# Patient Record
Sex: FEMALE | Race: WHITE | NOT HISPANIC OR LATINO | Employment: FULL TIME | URBAN - METROPOLITAN AREA
[De-identification: names, ages, dates, MRNs, and addresses within clinical notes are randomized per-mention and may not be internally consistent; named-entity substitution may affect disease eponyms.]

---

## 2016-10-27 LAB
EXTERNAL ABO GROUPING: NORMAL
EXTERNAL ANTIBODY SCREEN: NORMAL
EXTERNAL HEMATOCRIT: 33.7 %
EXTERNAL HEMOGLOBIN: 11.5 G/DL
EXTERNAL HEPATITIS B SURFACE ANTIGEN: NEGATIVE
EXTERNAL PLATELET COUNT: 238 K/ΜL
EXTERNAL RH FACTOR: POSITIVE
EXTERNAL RUBELLA IGG QUANTITATION: NORMAL
EXTERNAL SYPHILIS RPR SCREEN: NORMAL

## 2017-01-25 ENCOUNTER — ALLSCRIPTS OFFICE VISIT (OUTPATIENT)
Dept: OTHER | Facility: OTHER | Age: 27
End: 2017-01-25

## 2017-01-25 ENCOUNTER — APPOINTMENT (OUTPATIENT)
Dept: LAB | Facility: HOSPITAL | Age: 27
End: 2017-01-25
Attending: OBSTETRICS & GYNECOLOGY
Payer: COMMERCIAL

## 2017-01-25 ENCOUNTER — GENERIC CONVERSION - ENCOUNTER (OUTPATIENT)
Dept: OTHER | Facility: OTHER | Age: 27
End: 2017-01-25

## 2017-01-25 DIAGNOSIS — Z34.82 ENCOUNTER FOR SUPERVISION OF OTHER NORMAL PREGNANCY, SECOND TRIMESTER: ICD-10-CM

## 2017-01-25 LAB
BASOPHILS # BLD AUTO: 0.02 THOUSANDS/ΜL (ref 0–0.1)
BASOPHILS NFR BLD AUTO: 0 % (ref 0–1)
EOSINOPHIL # BLD AUTO: 0.06 THOUSAND/ΜL (ref 0–0.61)
EOSINOPHIL NFR BLD AUTO: 1 % (ref 0–6)
ERYTHROCYTE [DISTWIDTH] IN BLOOD BY AUTOMATED COUNT: 13 % (ref 11.6–15.1)
GLUCOSE 1H P 50 G GLC PO SERPL-MCNC: 134 MG/DL
HCT VFR BLD AUTO: 33.1 % (ref 34.8–46.1)
HGB BLD-MCNC: 10.9 G/DL (ref 11.5–15.4)
LYMPHOCYTES # BLD AUTO: 1.89 THOUSANDS/ΜL (ref 0.6–4.47)
LYMPHOCYTES NFR BLD AUTO: 20 % (ref 14–44)
MCH RBC QN AUTO: 30.7 PG (ref 26.8–34.3)
MCHC RBC AUTO-ENTMCNC: 32.9 G/DL (ref 31.4–37.4)
MCV RBC AUTO: 93 FL (ref 82–98)
MONOCYTES # BLD AUTO: 0.48 THOUSAND/ΜL (ref 0.17–1.22)
MONOCYTES NFR BLD AUTO: 5 % (ref 4–12)
NEUTROPHILS # BLD AUTO: 7.11 THOUSANDS/ΜL (ref 1.85–7.62)
NEUTS SEG NFR BLD AUTO: 74 % (ref 43–75)
PLATELET # BLD AUTO: 292 THOUSANDS/UL (ref 149–390)
PMV BLD AUTO: 10.6 FL (ref 8.9–12.7)
RBC # BLD AUTO: 3.55 MILLION/UL (ref 3.81–5.12)
WBC # BLD AUTO: 9.56 THOUSAND/UL (ref 4.31–10.16)

## 2017-01-25 PROCEDURE — 86592 SYPHILIS TEST NON-TREP QUAL: CPT

## 2017-01-25 PROCEDURE — 82950 GLUCOSE TEST: CPT

## 2017-01-25 PROCEDURE — 36415 COLL VENOUS BLD VENIPUNCTURE: CPT

## 2017-01-25 PROCEDURE — 85025 COMPLETE CBC W/AUTO DIFF WBC: CPT

## 2017-01-26 LAB — RPR SER QL: NORMAL

## 2017-02-08 ENCOUNTER — GENERIC CONVERSION - ENCOUNTER (OUTPATIENT)
Dept: OTHER | Facility: OTHER | Age: 27
End: 2017-02-08

## 2017-02-22 ENCOUNTER — ALLSCRIPTS OFFICE VISIT (OUTPATIENT)
Dept: OTHER | Facility: OTHER | Age: 27
End: 2017-02-22

## 2017-03-08 ENCOUNTER — GENERIC CONVERSION - ENCOUNTER (OUTPATIENT)
Dept: OTHER | Facility: OTHER | Age: 27
End: 2017-03-08

## 2017-03-22 ENCOUNTER — LAB REQUISITION (OUTPATIENT)
Dept: LAB | Facility: HOSPITAL | Age: 27
End: 2017-03-22
Payer: COMMERCIAL

## 2017-03-22 ENCOUNTER — GENERIC CONVERSION - ENCOUNTER (OUTPATIENT)
Dept: OTHER | Facility: OTHER | Age: 27
End: 2017-03-22

## 2017-03-22 DIAGNOSIS — O99.343 OTHER MENTAL DISORDERS COMPLICATING PREGNANCY, THIRD TRIMESTER: ICD-10-CM

## 2017-03-22 PROCEDURE — 87653 STREP B DNA AMP PROBE: CPT | Performed by: OBSTETRICS & GYNECOLOGY

## 2017-03-24 LAB — GP B STREP DNA SPEC QL NAA+PROBE: NORMAL

## 2017-03-28 ENCOUNTER — GENERIC CONVERSION - ENCOUNTER (OUTPATIENT)
Dept: OTHER | Facility: OTHER | Age: 27
End: 2017-03-28

## 2017-03-28 ENCOUNTER — ALLSCRIPTS OFFICE VISIT (OUTPATIENT)
Dept: OTHER | Facility: OTHER | Age: 27
End: 2017-03-28

## 2017-04-05 ENCOUNTER — GENERIC CONVERSION - ENCOUNTER (OUTPATIENT)
Dept: OTHER | Facility: OTHER | Age: 27
End: 2017-04-05

## 2017-04-12 ENCOUNTER — GENERIC CONVERSION - ENCOUNTER (OUTPATIENT)
Dept: OTHER | Facility: OTHER | Age: 27
End: 2017-04-12

## 2017-04-18 PROBLEM — F41.9 ANXIETY DURING PREGNANCY IN THIRD TRIMESTER, ANTEPARTUM: Status: ACTIVE | Noted: 2017-04-18

## 2017-04-18 PROBLEM — O99.343 ANXIETY DURING PREGNANCY IN THIRD TRIMESTER, ANTEPARTUM: Status: ACTIVE | Noted: 2017-04-18

## 2017-04-18 PROBLEM — O98.313 GENITAL HERPES AFFECTING PREGNANCY IN THIRD TRIMESTER: Status: ACTIVE | Noted: 2017-04-18

## 2017-04-18 PROBLEM — A60.09 GENITAL HERPES AFFECTING PREGNANCY IN THIRD TRIMESTER: Status: ACTIVE | Noted: 2017-04-18

## 2017-04-19 ENCOUNTER — HOSPITAL ENCOUNTER (OUTPATIENT)
Dept: LABOR AND DELIVERY | Facility: HOSPITAL | Age: 27
Discharge: HOME | End: 2017-04-19
Payer: COMMERCIAL

## 2017-04-19 ENCOUNTER — ANESTHESIA EVENT (INPATIENT)
Dept: LABOR AND DELIVERY | Facility: HOSPITAL | Age: 27
End: 2017-04-19
Payer: COMMERCIAL

## 2017-04-19 ENCOUNTER — ANESTHESIA (INPATIENT)
Dept: LABOR AND DELIVERY | Facility: HOSPITAL | Age: 27
End: 2017-04-19
Payer: COMMERCIAL

## 2017-04-19 ENCOUNTER — HOSPITAL ENCOUNTER (INPATIENT)
Facility: HOSPITAL | Age: 27
LOS: 1 days | Discharge: HOME | End: 2017-04-20
Attending: OBSTETRICS & GYNECOLOGY | Admitting: OBSTETRICS & GYNECOLOGY
Payer: COMMERCIAL

## 2017-04-19 DIAGNOSIS — Z3A.40 40 WEEKS GESTATION OF PREGNANCY: ICD-10-CM

## 2017-04-19 PROBLEM — F41.9 ANXIETY: Status: ACTIVE | Noted: 2017-04-19

## 2017-04-19 PROBLEM — F98.8 OTHER SPECIFIED BEHAVIORAL AND EMOTIONAL DISORDERS WITH ONSET USUALLY OCCURRING IN CHILDHOOD AND ADOLESCENCE: Status: ACTIVE | Noted: 2017-04-19

## 2017-04-19 LAB
ABO GROUP BLD: NORMAL
BASE EXCESS BLDCOA CALC-SCNC: -5.3 MMOL/L (ref 3–11)
BASE EXCESS BLDCOV CALC-SCNC: -6.3 MMOL/L (ref 1–9)
BLD GP AB SCN SERPL QL: NEGATIVE
ERYTHROCYTE [DISTWIDTH] IN BLOOD BY AUTOMATED COUNT: 14.4 % (ref 11.6–15.1)
HCO3 BLDCOA-SCNC: 22 MMOL/L (ref 17.3–27.3)
HCO3 BLDCOV-SCNC: 18.2 MMOL/L (ref 12.2–28.6)
HCT VFR BLD AUTO: 37.5 % (ref 34.8–46.1)
HGB BLD-MCNC: 12.4 G/DL (ref 11.5–15.4)
MCH RBC QN AUTO: 31.3 PG (ref 26.8–34.3)
MCHC RBC AUTO-ENTMCNC: 33.1 G/DL (ref 31.4–37.4)
MCV RBC AUTO: 95 FL (ref 82–98)
O2 CT VFR BLDCOA CALC: 9.1 ML/DL
OXYHGB MFR BLDCOA: 39.2 %
OXYHGB MFR BLDCOV: 75.4 %
PCO2 BLDCOA: 49.2 MM[HG] (ref 30–60)
PCO2 BLDCOV: 34.1 MM HG (ref 27–43)
PH BLDCOA: 7.27 [PH] (ref 7.23–7.43)
PH BLDCOV: 7.35 [PH] (ref 7.19–7.49)
PLATELET # BLD AUTO: 288 THOUSANDS/UL (ref 149–390)
PMV BLD AUTO: 12.5 FL (ref 8.9–12.7)
PO2 BLDCOA: 18.2 MM HG (ref 5–25)
PO2 BLDCOV: 32.2 MM HG (ref 15–45)
RBC # BLD AUTO: 3.96 MILLION/UL (ref 3.81–5.12)
RH BLD: POSITIVE
SAO2 % BLDCOV: 17.9 ML/DL
SPECIMEN EXPIRATION DATE: NORMAL
WBC # BLD AUTO: 10.6 THOUSAND/UL (ref 4.31–10.16)

## 2017-04-19 PROCEDURE — 3E0P7GC INTRODUCTION OF OTHER THERAPEUTIC SUBSTANCE INTO FEMALE REPRODUCTIVE, VIA NATURAL OR ARTIFICIAL OPENING: ICD-10-PCS | Performed by: OBSTETRICS & GYNECOLOGY

## 2017-04-19 PROCEDURE — 86900 BLOOD TYPING SEROLOGIC ABO: CPT | Performed by: OBSTETRICS & GYNECOLOGY

## 2017-04-19 PROCEDURE — 85027 COMPLETE CBC AUTOMATED: CPT | Performed by: OBSTETRICS & GYNECOLOGY

## 2017-04-19 PROCEDURE — 86592 SYPHILIS TEST NON-TREP QUAL: CPT | Performed by: OBSTETRICS & GYNECOLOGY

## 2017-04-19 PROCEDURE — 10907ZC DRAINAGE OF AMNIOTIC FLUID, THERAPEUTIC FROM PRODUCTS OF CONCEPTION, VIA NATURAL OR ARTIFICIAL OPENING: ICD-10-PCS | Performed by: OBSTETRICS & GYNECOLOGY

## 2017-04-19 PROCEDURE — 86850 RBC ANTIBODY SCREEN: CPT | Performed by: OBSTETRICS & GYNECOLOGY

## 2017-04-19 PROCEDURE — 4A1HXCZ MONITORING OF PRODUCTS OF CONCEPTION, CARDIAC RATE, EXTERNAL APPROACH: ICD-10-PCS | Performed by: OBSTETRICS & GYNECOLOGY

## 2017-04-19 PROCEDURE — 82805 BLOOD GASES W/O2 SATURATION: CPT | Performed by: OBSTETRICS & GYNECOLOGY

## 2017-04-19 PROCEDURE — 3E033VJ INTRODUCTION OF OTHER HORMONE INTO PERIPHERAL VEIN, PERCUTANEOUS APPROACH: ICD-10-PCS | Performed by: OBSTETRICS & GYNECOLOGY

## 2017-04-19 PROCEDURE — 86901 BLOOD TYPING SEROLOGIC RH(D): CPT | Performed by: OBSTETRICS & GYNECOLOGY

## 2017-04-19 PROCEDURE — 0HQ9XZZ REPAIR PERINEUM SKIN, EXTERNAL APPROACH: ICD-10-PCS | Performed by: OBSTETRICS & GYNECOLOGY

## 2017-04-19 RX ORDER — DIAPER,BRIEF,INFANT-TODD,DISP
1 EACH MISCELLANEOUS 4 TIMES DAILY PRN
Status: DISCONTINUED | OUTPATIENT
Start: 2017-04-19 | End: 2017-04-20 | Stop reason: HOSPADM

## 2017-04-19 RX ORDER — ACETAMINOPHEN 325 MG/1
650 TABLET ORAL EVERY 6 HOURS PRN
Status: DISCONTINUED | OUTPATIENT
Start: 2017-04-19 | End: 2017-04-20 | Stop reason: HOSPADM

## 2017-04-19 RX ORDER — OXYCODONE HYDROCHLORIDE AND ACETAMINOPHEN 5; 325 MG/1; MG/1
2 TABLET ORAL EVERY 4 HOURS PRN
Status: DISCONTINUED | OUTPATIENT
Start: 2017-04-19 | End: 2017-04-20 | Stop reason: HOSPADM

## 2017-04-19 RX ORDER — DOCUSATE SODIUM 100 MG/1
100 CAPSULE, LIQUID FILLED ORAL 2 TIMES DAILY
Status: DISCONTINUED | OUTPATIENT
Start: 2017-04-19 | End: 2017-04-20 | Stop reason: HOSPADM

## 2017-04-19 RX ORDER — OXYCODONE HYDROCHLORIDE AND ACETAMINOPHEN 5; 325 MG/1; MG/1
1 TABLET ORAL EVERY 4 HOURS PRN
Status: DISCONTINUED | OUTPATIENT
Start: 2017-04-19 | End: 2017-04-20 | Stop reason: HOSPADM

## 2017-04-19 RX ORDER — CALCIUM CARBONATE 200(500)MG
500 TABLET,CHEWABLE ORAL 3 TIMES DAILY PRN
Status: DISCONTINUED | OUTPATIENT
Start: 2017-04-19 | End: 2017-04-20 | Stop reason: HOSPADM

## 2017-04-19 RX ORDER — OXYTOCIN/RINGER'S LACTATE 30/500 ML
1-30 PLASTIC BAG, INJECTION (ML) INTRAVENOUS
Status: DISCONTINUED | OUTPATIENT
Start: 2017-04-19 | End: 2017-04-19

## 2017-04-19 RX ORDER — DIPHENHYDRAMINE HYDROCHLORIDE 50 MG/ML
25 INJECTION INTRAMUSCULAR; INTRAVENOUS EVERY 6 HOURS PRN
Status: DISCONTINUED | OUTPATIENT
Start: 2017-04-19 | End: 2017-04-20 | Stop reason: HOSPADM

## 2017-04-19 RX ORDER — PNV NO.95/FERROUS FUM/FOLIC AC 28MG-0.8MG
1 TABLET ORAL DAILY
COMMUNITY
End: 2018-09-12

## 2017-04-19 RX ORDER — ONDANSETRON 2 MG/ML
4 INJECTION INTRAMUSCULAR; INTRAVENOUS EVERY 8 HOURS PRN
Status: DISCONTINUED | OUTPATIENT
Start: 2017-04-19 | End: 2017-04-20 | Stop reason: HOSPADM

## 2017-04-19 RX ORDER — ONDANSETRON 2 MG/ML
4 INJECTION INTRAMUSCULAR; INTRAVENOUS EVERY 4 HOURS PRN
Status: DISCONTINUED | OUTPATIENT
Start: 2017-04-19 | End: 2017-04-20 | Stop reason: HOSPADM

## 2017-04-19 RX ORDER — SODIUM CHLORIDE, SODIUM LACTATE, POTASSIUM CHLORIDE, CALCIUM CHLORIDE 600; 310; 30; 20 MG/100ML; MG/100ML; MG/100ML; MG/100ML
125 INJECTION, SOLUTION INTRAVENOUS CONTINUOUS
Status: DISCONTINUED | OUTPATIENT
Start: 2017-04-19 | End: 2017-04-19

## 2017-04-19 RX ORDER — DIPHENHYDRAMINE HCL 25 MG
25 TABLET ORAL EVERY 6 HOURS PRN
Status: DISCONTINUED | OUTPATIENT
Start: 2017-04-19 | End: 2017-04-20 | Stop reason: HOSPADM

## 2017-04-19 RX ORDER — IBUPROFEN 600 MG/1
600 TABLET ORAL EVERY 6 HOURS PRN
Status: DISCONTINUED | OUTPATIENT
Start: 2017-04-19 | End: 2017-04-20 | Stop reason: HOSPADM

## 2017-04-19 RX ADMIN — SODIUM CHLORIDE, SODIUM LACTATE, POTASSIUM CHLORIDE, AND CALCIUM CHLORIDE 125 ML/HR: .6; .31; .03; .02 INJECTION, SOLUTION INTRAVENOUS at 14:45

## 2017-04-19 RX ADMIN — Medication 10 ML/HR: at 17:06

## 2017-04-19 RX ADMIN — MISOPROSTOL 25 MCG: 100 TABLET ORAL at 07:07

## 2017-04-19 RX ADMIN — Medication 25 MCG: at 07:07

## 2017-04-20 VITALS
TEMPERATURE: 98.9 F | WEIGHT: 136 LBS | DIASTOLIC BLOOD PRESSURE: 97 MMHG | HEIGHT: 62 IN | OXYGEN SATURATION: 99 % | SYSTOLIC BLOOD PRESSURE: 133 MMHG | RESPIRATION RATE: 18 BRPM | HEART RATE: 75 BPM | BODY MASS INDEX: 25.03 KG/M2

## 2017-04-20 LAB — RPR SER QL: NORMAL

## 2017-04-20 RX ORDER — IBUPROFEN 600 MG/1
600 TABLET ORAL EVERY 6 HOURS PRN
Qty: 30 TABLET | Refills: 0
Start: 2017-04-20 | End: 2018-09-12

## 2017-04-20 RX ORDER — DOCUSATE SODIUM 100 MG/1
100 CAPSULE, LIQUID FILLED ORAL 2 TIMES DAILY
Qty: 60 CAPSULE | Refills: 0
Start: 2017-04-20 | End: 2018-09-12

## 2017-04-20 RX ORDER — ACETAMINOPHEN 325 MG/1
650 TABLET ORAL EVERY 4 HOURS PRN
Qty: 30 TABLET | Refills: 0
Start: 2017-04-20 | End: 2018-09-12

## 2017-04-20 RX ORDER — DIAPER,BRIEF,INFANT-TODD,DISP
1 EACH MISCELLANEOUS 4 TIMES DAILY PRN
Qty: 30 G | Refills: 0
Start: 2017-04-20 | End: 2018-09-12

## 2017-04-20 RX ADMIN — DOCUSATE SODIUM 100 MG: 100 CAPSULE, LIQUID FILLED ORAL at 17:59

## 2017-04-20 RX ADMIN — IBUPROFEN 600 MG: 600 TABLET, FILM COATED ORAL at 02:42

## 2017-04-20 RX ADMIN — DOCUSATE SODIUM 100 MG: 100 CAPSULE, LIQUID FILLED ORAL at 08:47

## 2017-04-20 RX ADMIN — IBUPROFEN 600 MG: 600 TABLET, FILM COATED ORAL at 16:30

## 2017-04-20 RX ADMIN — IBUPROFEN 600 MG: 600 TABLET, FILM COATED ORAL at 08:46

## 2017-04-20 RX ADMIN — Medication 1 TABLET: at 08:47

## 2017-04-24 ENCOUNTER — TELEPHONE (OUTPATIENT)
Dept: LABOR AND DELIVERY | Facility: HOSPITAL | Age: 27
End: 2017-04-24

## 2017-04-28 LAB — PLACENTA IN STORAGE: NORMAL

## 2017-05-15 ENCOUNTER — ALLSCRIPTS OFFICE VISIT (OUTPATIENT)
Dept: OTHER | Facility: OTHER | Age: 27
End: 2017-05-15

## 2017-05-30 ENCOUNTER — GENERIC CONVERSION - ENCOUNTER (OUTPATIENT)
Dept: OTHER | Facility: OTHER | Age: 27
End: 2017-05-30

## 2017-05-30 ENCOUNTER — ALLSCRIPTS OFFICE VISIT (OUTPATIENT)
Dept: OTHER | Facility: OTHER | Age: 27
End: 2017-05-30

## 2017-06-16 ENCOUNTER — ALLSCRIPTS OFFICE VISIT (OUTPATIENT)
Dept: OTHER | Facility: OTHER | Age: 27
End: 2017-06-16

## 2017-08-07 ENCOUNTER — TRANSCRIBE ORDERS (OUTPATIENT)
Dept: LAB | Facility: HOSPITAL | Age: 27
End: 2017-08-07

## 2017-08-07 DIAGNOSIS — Z00.8 HEALTH EXAMINATION IN POPULATION SURVEY: Primary | ICD-10-CM

## 2017-08-09 ENCOUNTER — APPOINTMENT (OUTPATIENT)
Dept: LAB | Facility: HOSPITAL | Age: 27
End: 2017-08-09
Payer: COMMERCIAL

## 2017-08-09 DIAGNOSIS — Z00.8 HEALTH EXAMINATION IN POPULATION SURVEY: ICD-10-CM

## 2017-08-09 LAB
CHOLEST SERPL-MCNC: 173 MG/DL (ref 50–200)
EST. AVERAGE GLUCOSE BLD GHB EST-MCNC: 114 MG/DL
HBA1C MFR BLD: 5.6 % (ref 4.2–6.3)
HDLC SERPL-MCNC: 71 MG/DL (ref 40–60)
LDLC SERPL CALC-MCNC: 94 MG/DL (ref 0–100)
TRIGL SERPL-MCNC: 41 MG/DL

## 2017-08-09 PROCEDURE — 83036 HEMOGLOBIN GLYCOSYLATED A1C: CPT

## 2017-08-09 PROCEDURE — 36415 COLL VENOUS BLD VENIPUNCTURE: CPT

## 2017-08-09 PROCEDURE — 80061 LIPID PANEL: CPT

## 2017-08-22 ENCOUNTER — ALLSCRIPTS OFFICE VISIT (OUTPATIENT)
Dept: OTHER | Facility: OTHER | Age: 27
End: 2017-08-22

## 2017-10-23 NOTE — PROGRESS NOTES
Assessment  1  Encounter for gynecological examination with abnormal finding (V72 31) (Z01 411)   2  Dietary calcium deficiency (269 3) (E58)   3  Anxiety disorder (300 00) (F41 9)   4  Encounter for surveillance of condom contraception (V25 49) (Z30 49)    Plan  Anxiety disorder,     · Sertraline HCl - 25 MG Oral Tablet; Take 1 tablet daily for 7 days, then take 2 a day  thereafter   Rx By: Lulu Pedraza; Dispense: 30 Days ; #:60 Tablet; Refill: 0;For: Anxiety disorder, ; GISSEL = N; Sent To: [address] [facility]  Encounter for gynecological examination with abnormal finding    · Follow-up visit in 1 year Evaluation and Treatment  Follow-up  Status: Hold For -  Scheduling  Requested for: 09Izd6929   Ordered; For: Encounter for gynecological examination with abnormal finding; Ordered By: Lulu Pedraza Performed:  Due: 61QGK4118   · Begin or continue regular aerobic exercise   Gradually work up to at least {count1}  sessions of {dur1} of exercise a week ; Status:Complete;   Done: 37Xon5812 10:26AM   Ordered;For:Encounter for gynecological examination with abnormal finding; Ordered By:Sandra Santamaria;   · Drink plenty of fluids ; Status:Complete;   Done: 04DWD7938 10:26AM   Ordered;For:Encounter for gynecological examination with abnormal finding; Ordered By:Sandra Santamaria;   · Eat a low fat and low cholesterol diet ; Status:Complete;   Done: 13KSK6623 10:26AM   Ordered;For:Encounter for gynecological examination with abnormal finding; Ordered By:Sandra Santamaria;   · Eat a normal well-balanced diet ; Status:Complete;   Done: 35DHF4592 10:26AM   Ordered;For:Encounter for gynecological examination with abnormal finding; Ordered By:Sandra Santamaria;   · Eat foods that are high in calcium ; Status:Complete;   Done: 31OOR5520 10:26AM   Ordered;For:Encounter for gynecological examination with abnormal finding; Ordered By:Sandra Santamaria;   · How to prevent vaginal infections ; Status:Complete;   Done: 27Are0981 10:26AM   Ordered;For:Encounter for gynecological examination with abnormal finding; Ordered By:Deidra Santamaria;   · Many types of infections can be passed person to person  To prevent this you need to be  isolated for 7 days ; Status:Complete;   Done: 22Aug2017 10:26AM   Ordered;For:Encounter for gynecological examination with abnormal finding; Ordered By:Deidra Santamaria;   · Regular aerobic exercise can help reduce stress ; Status:Complete;   Done: 47RFV5083  10:26AM   Ordered;For:Encounter for gynecological examination with abnormal finding; Ordered By:Dai Santamaria;   · Stretch and warm up your muscles during the first 10 minutes , then cool down your  muscles for the last 10 minutes of exercise ; Status:Complete;   Done: 11EVE3261  10:26AM   Ordered;For:Encounter for gynecological examination with abnormal finding; Ordered By:Deidra Santamaria;   · These are things you can do to prevent falls in and around the home ; Status:Complete;    Done: 22Aug2017 10:26AM   Ordered;For:Encounter for gynecological examination with abnormal finding; Ordered By:Deidra Santamaria;   · Use a sun block product with an SPF of 15 or more ; Status:Complete;   Done:  22Aug2017 10:26AM   Ordered;For:Encounter for gynecological examination with abnormal finding; Ordered By:Deidra Santamaria;   · We encourage all of our patients to exercise regularly    30 minutes of exercise or physical  activity five or more days a week is recommended for children and adults ;  Status:Complete;   Done: 22Aug2017 10:26AM   Ordered;For:Encounter for gynecological examination with abnormal finding; Ordered By:Deidra Santamaria;   · We recommend that you change your eating habits slowly ; Status:Complete;   Done:  24FXA0613 10:26AM   Ordered;For:Encounter for gynecological examination with abnormal finding; Ordered By:Deidra Santamaria;   · We recommend that you create an advance directive ; Status:Complete;   Done:  22Aug2017 10:26AM Ordered;For:Encounter for gynecological examination with abnormal finding; Ordered By:Dai Santamaria;   · We recommend that you follow these steps to lower your risk of osteoporosis  ;  Status:Complete;   Done: 24UJS7621 10:26AM   Ordered;For:Encounter for gynecological examination with abnormal finding; Ordered By:Maxx Santamaria;   · We recommend you modify your diet to achieve and maintain a healthy weight  Being  overweight may increase your risk for developing health problems such as diabetes,  heart disease, and cancer  Avoid high fat foods and eat a balanced diet rich  in fruits and vegetables  The combination of a reduced-calorie diet and increased  physical activity is recommended  Please let us know if you would like to  learn more about your nutrition and calorie needs, and additional options including  weight loss programs that can help you achieve your goals ; Status:Complete;   Done:  89Piz2949 10:26AM   Ordered;For:Encounter for gynecological examination with abnormal finding; Ordered By:Maxx Santamaria;    Discussion/Summary  Currently, she eats an adequate diet and has an inadequate exercise regimen  cervical cancer screening is current next cervical cancer screening is due 8/19 Breast cancer screening: breast cancer screening is not indicated  Colorectal cancer screening: colorectal cancer screening is not indicated  Osteoporosis screening: bone mineral density testing is not indicated  Advice and education were given regarding aerobic exercise, calcium supplements, reproductive health and contraception  Patient discussion: discussed with the patient  Ca++ intake encrouaged  Info sheets for non dairy sources provided  ENcourgaed pt to discuss CA++ need s of daughter with Peds (eczema and asked to avoid dairy)  The patient has the current Goals: Strong bones  better anxiety control  The patent has the current Barriers: 0  Patient is able to Self-Care        History of Present Illness  HPI: Pt is a 27 yo  c LMP lactating using condoms for [facility] presents for preventive care  same lifetime safe sexual practices followed does feel safe in relationshipDuse she does/not/is wish to begin/beginning process of cessation     Review of Systems  no nonmenstrual bleeding-- and-- no dysuria  OB History  Pregnancy History (Brief):   Prior pregnancies: : 2  Para: 2 (full-term)-- and-- 2 (living)   Delivery type: 2 vaginal       Active Problems  1  Acute upper respiratory infection (465 9) (J06 9)   2  Anxiety disorder (300 00) (F41 9)   3  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)   4  Contraceptive education (V25 09) (Z30 09)   5  Eczema (692 9) (L30 9)    Past Medical History   · Denied: History of abnormal cervical Pap smear   · History of chickenpox (V12 09) (Z86 19)   · History of herpes genitalis (V12 09) (Z86 19)   · History of pregnancy (V13 29)   · History of vaccination against human papillomavirus (V45 89) (Z92 29)   · History of Menarche (V21 8)    The active problems and past medical history were reviewed and updated today  Surgical History   · Denied: History Of Prior Surgery    The surgical history was reviewed and updated today         Family History  Mother    · Family history of Family Health Status Of Mother - Good  Father    · Family history of Family Health Status Of Father - Good  Maternal Grandmother    · Family history of lung cancer (V16 1) (Z80 1)  Paternal Grandmother    · Family history of myocardial infarction (V17 3) (Z82 49)  Family History    · Denied: Family history of breast cancer   · Denied: Family history of colon cancer   · Denied: FHx: ovarian cancer    Social History   · Education history   · BA   · Exercise habits   · active, but no formal exercise   · Monogamous relationship   · Never A Smoker   · No alcohol use   · social etoh, stopped with + UPT   · No drug use   · No preference on Mosque beliefs   · 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 [employer]  The social history was reviewed and updated today  Current Meds   1  Breast Pump Miscellaneous; electric breast pump with double pump setup, supplies,   AND INSTRUCTION in use; Therapy: 70HRX3212 to (Last Rx:28Mar2017) Ordered   2  Multi Prenatal TABS; Therapy: (Recorded:07Sep2016) to Recorded    Allergies  1  No Known Drug Allergies  2  No Known Environmental Allergies   3  No Known Food Allergies    Vitals   Recorded: 96VSA7535 32:05LY   Systolic 507, Sitting   Diastolic 80, Sitting   Height 5 ft 2 44 in   Weight 109 lb    BMI Calculated 19 66   BSA Calculated 1 48   LMP Breast Feeding     Physical Exam    Constitutional   General appearance: No acute distress, well appearing and well nourished  Neck   Neck: Normal, supple, trachea midline, no masses  Thyroid: Normal, no thyromegaly  Pulmonary   Respiratory effort: No increased work of breathing or signs of respiratory distress  Genitourinary   External genitalia: Normal and no lesions appreciated  Vagina: Normal, no lesions or dryness appreciated  -- red, smooth  Urethral meatus: Normal     Bladder: Normal, soft, non-tender and no prolapse or masses appreciated  Cervix: Normal, no palpable masses  -- parous s lesions  Uterus: Normal, non-tender, not enlarged, and no palpable masses  -- NSSC,AF,NT,mobile  Adnexa/parametria: Normal, non-tender and no fullness or masses appreciated  -- no masses or tenderness  Anus, perineum, and rectum: Normal sphincter tone, no masses, and no prolapse  No masses or noduaslrity  RV adeq thickness   Chest   Breasts: Normal and no dimpling or skin changes noted  -- lactating  Abdomen   Abdomen: Normal, non-tender, and no organomegaly noted  Liver and spleen: No hepatomegaly or splenomegaly  Examination for hernias: No hernias appreciated  Lymphatic   Palpation of lymph nodes in neck, axillae, groin and/or other locations: No lymphadenopathy or masses noted      Skin Skin and subcutaneous tissue: Normal skin turgor and no rashes      Psychiatric   Orientation to person, place, and time: Normal     Mood and affect: Normal        Signatures   Electronically signed by : LENIN Castanon ; Aug 22 2017 10:33AM EST                       (Author)

## 2018-01-12 NOTE — PROGRESS NOTES
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)    Current Meds   1  Amphetamine-Dextroamphet ER 20 MG Oral Capsule Extended Release 24 Hour; TAKE   1 CAPSULE DAILY FOR ADHD; Therapy: 95SCL2333 to (Evaluate:08Jan2015); Last Rx:22Pnj8357 Ordered    Allergies    1  No Known Drug Allergies    Vitals  Signs    LMP: 43ZBW9160  Height: 5 ft 2 5 in  Weight: 110 lb   BMI Calculated: 19 8  BSA Calculated: 1 49    Results/Data  Urine HCG- POC 83Hae3065 01:50PM Ismael Waldron     Test Name Result Flag Reference   Urine HCG Positive         Assessment    1  Menstruation, suppression (626 8) (N94 89)    Plan  Menstruation, suppression    · Urine HCG- POC; Status:Complete - Retrospective By Protocol Authorization;   Done:  61OCK5523 01:50PM    Future Appointments    Date/Time Provider Specialty Site   09/07/2016 10:30 AM LENIN Morse   [address] [facility]     Signatures   Electronically signed by : LENIN Zarate ; Aug 25 2016  2:13PM EST                       (Author)

## 2018-01-13 VITALS — BODY MASS INDEX: 20.34 KG/M2 | WEIGHT: 114.8 LBS | HEIGHT: 63 IN

## 2018-01-13 VITALS
WEIGHT: 109 LBS | SYSTOLIC BLOOD PRESSURE: 130 MMHG | BODY MASS INDEX: 20.06 KG/M2 | HEIGHT: 62 IN | DIASTOLIC BLOOD PRESSURE: 80 MMHG

## 2018-01-13 NOTE — RESULT NOTES
Message   Please call patient with reassuring results  Thank you       Verified Results  (1) SEQUENTIAL SCREEN 2 27Oct2016 11:24AM Jasmin Villagran     Test Name Result Flag Reference   SCAN RESULT      Results available in the [facility]

## 2018-01-14 VITALS
DIASTOLIC BLOOD PRESSURE: 60 MMHG | BODY MASS INDEX: 20.55 KG/M2 | SYSTOLIC BLOOD PRESSURE: 110 MMHG | HEIGHT: 63 IN | WEIGHT: 116 LBS

## 2018-01-15 NOTE — PROGRESS NOTES
DEC 1 2016         RE: Tomasz Hannon                                 To: Jonny Jeans, M D    MR#: [id number]                                    [address], [address]   : [facility] [address] [facility], [address] [place] [address],[address]   ENC: [id number]                             Fax: [phone]   (Exam #: [id number])      The LMP of this 31year old,  G2, P1-0-0-1 patient was 2016, giving   her an CHARLIE of 2017 and a current gestational age of 25 weeks 1 day   by dates  A sonographic examination was performed on DEC 1 2016 using real   time equipment  The ultrasound examination was performed using abdominal    technique  The patient has a BMI of 21 6  Her blood pressure today was   109/73  Earliest ultrasound found in her record: 16 8w2d 17 CHARLIE      Problem list:   1  History of a prior daughter Laure Santana  2011 weighed  8 lbs  11 oz  and whose vaginal delivery was complicated by shoulder dystocia   without permanent brachial plexus injury     2  Abnormal first trimester uterine Dopplers currently on baby aspirin   daily      Cardiac motion was observed  at 151 bpm       INDICATIONS      fetal anatomical survey   abnormal uterine Doppler      Exam Types      LEVEL II   Transvaginal      RESULTS      Fetus # 1 of 1   Vertex presentation   Fetal growth appeared  normal   Placenta Location = Anterior   No placenta previa   Placenta Grade = I      MEASUREMENTS (* Included In Average GA)      AC              15 6 cm        20 weeks 4 days* (59%)   BPD              4 6 cm        19 weeks 6  days* (50%)   HC              17 4 cm        19 weeks 6 days* (42%)   Femur            3 2 cm        20 weeks 1 day * (41%)      Nuchal Fold      3 2 mm      Humerus          3 1 cm        20 weeks 4 days  (63%)      Cerebellum        2 0 cm        19 weeks 6 days   Biorbit          3 0 cm        19 weeks 4 days   CisternaMagna    3 0 mm HC/AC           1 12   FL/AC           0 20   FL/BPD          0 69   EFW (Ac/Fl/Hc)   342 grams - 0 lbs 12 oz       THE AVERAGE GESTATIONAL AGE is 20 weeks 1 day +/- 10 days  AMNIOTIC FLUID         Largest Vertical Pocket = 5 4 cm   Amniotic Fluid: Normal      UTERINE ARTERIES                                  S/D   PI    RI     NOTCH       Left Uterine Artery        2 36  0 95  0 58       Right Uterine Artery       2 47  1 03  0 59      CERVICAL EVALUATION      SUPINE      Cervical Length: 4 50 cm      OTHER TEST RESULTS           Funneling?: No              Dynamic Changes?: No        Resp  To TFP?: No      ANATOMY      Head                                    Normal   Face/Neck                               Normal   Th  Cav  Normal   Heart                                    Normal   Abd  Cav  Normal   Stomach                                 Normal   Right Kidney                            Normal   Left Kidney                             Normal    Bladder                                 Normal   Abd  Wall                               Normal   Spine                                   Normal   Extrems                                 Normal   Genitalia                               Normal    Placenta                                Normal   Umbl  Cord                              Normal   Uterus                                  Normal   PCI                                     Normal      ANATOMY DETAILS      Visualized  Appearing Sonographically Normal:   HEAD: (Calvarium, BPD Level, Cavum, Lateral Ventricles, Choroid Plexus,   Cerebellum, Cisterna Magna);    FACE/NECK: (Neck, Nuchal Fold, Profile,   Orbits, Nose/Lips, Palate, Face);    TH  CAV  : (Lungs, Diaphragm);        HEART: (Four Chamber View, Proximal Left Outflow, Proximal Right Outflow,   3VV, 3 Vessel Trachea, Short Axis of Greater Vessels, Ductal Arch, Aortic   Arch, Interventricular Septum, Interatrial Septum, IVC, SVC, Cardiac Axis,   Cardiac  Position);    ABD  CAV : (Liver);    STOMACH, RIGHT KIDNEY, LEFT   KIDNEY, BLADDER, ABD  WALL, SPINE: (Cervical Spine, Thoracic Spine, Lumbar   Spine, Sacrum);    EXTREMS: (Lt Humerus, Rt Humerus, Lt Forearm, Rt   Forearm, Lt Hand, Rt Hand, Lt  Femur, Rt Femur, Lt Low Leg, Rt Low Leg, Lt   Foot, Rt Foot);    GENITALIA, PLACENTA, UMBL  CORD, UTERUS, PCI      ANATOMY COMMENTS       Her survey of the fetal anatomy is complete  No fetal structural abnormality or ultrasound  marker for aneuploidy is   identified on the Level II ultrasound study today  Fetal growth and   amniotic fluid volume appear normal   Active movement of the fetal body &   extremities was seen  There is no suspicion of a subchorionic bleed  The   placental cord insertion was normal        The uterine artery dopplers are normal for gestational age  ADNEXA      The left ovary appeared normal and measured 2 3 x 3 0 x 1 6 cm with a   volume of 5 8 cc  The right  ovary appeared normal and measured 2 2 x 2 2 x   2 0 cm with a volume of 5 1 cc  IMPRESSION      Fontanez IUP   20 weeks and 1 day by this ultrasound  (CHARLIE=APR 19 2017)   Vertex presentation   Fetal growth appeared normal    Normal anatomy survey   Regular fetal heart rate of 151 bpm   Anterior placenta   No placenta previa      GENERAL COMMENT      The patient was informed of the findings and counseled about the   limitations of the exam in detecting  all forms of fetal congenital   abnormalities  She denies any vaginal bleeding or uterine cramping/contractions  She does   feel fetal movement  Exam shows she is comfortable and her abdomen is non tender  Her   sequential  screen was normal with a one in 10,000 risk for trisomy 21 and   trisomy 25 and a one and 6000 risk for neural tube defects  Follow up recommended:   1  She can stop her baby aspirin since her uterine artery Dopplers have   resolved      2  With her prior history of a shoulder dystocia recommend further   ultrasounds in the third trimester for an estimated fetal weight closer to   delivery either in [facility] or in her local ob office  We would be happy to   schedule these in our  office if her office contacts us  She is aware that   a history of shoulder dystocia is worriesome in that it could occur again   and future cases may cause long term fetal neurologic issues of the   affected arm or in rare cases fetal neurologic  issues due to hypoxemia  She would rather deliver vaginally as long as her next baby weighs less   than her prior child  She is aware that an ultrasound estimated fetal   weight near-term can be off by 1 pound either way  She does not remember    her OB provider at the time of her delivery telling her that she needs a    with every future pregnancy  MICHELLE Brown S  LENIN Martinez  Maternal-Fetal Medicine   Electronically signed 16  16:58           AMENDMENTS:  1  Records received from [facility]  In multiple areas a normal vaginal delivery was documented and I didn't find any evidence of shoulder dystocia documented  in her records      Electronically signed by:Melba TROY MD,error  Dec 11 2016  6:30PM EST

## 2018-01-17 NOTE — PROGRESS NOTES
OCT 11 2016         RE: Raisa Pablo                                 To: LENIN Lorenzana    MR#: [id number]                                    [address], [address]   : [facility] [address] [facility], [address]   ENC: [id number]                             Fax: [phone]   (Exam #: [id number])      The LMP of this 31year old,  G2, P1-0-0-1 patient was 2016, giving   her an CHARLIE of 2017 and a current gestational age of 16 weeks 6 days   by dates  A sonographic examination was performed on OCT 11 2016 using   real time equipment  The ultrasound examination was performed using   abdominal technique  The patient has a BMI of 20 5  Her blood pressure   today was 126/87  Earliest ultrasound found in her record: /168w2d 17 CHARLIE      Thank you very much requesting a sequential risk assessment ultrasound and   genetic  evaluation  Please note, a  consultation was   not performed  This is the patient's second pregnancy  Her first   pregnancy resulted in a 8 lbs  11 oz  female infant via vaginal delivery   complicated by shoulder dystocia without permanent brachial plexus injury  The patient has  no significant medical or surgical history otherwise  She denies the current use of tobacco, ETOH, or drugs  She currently   takes prenatal vitamins and has no significant drug allergies  Her family   medical history is noncontributory  Review of systems is otherwise   negative  On exam, the patient appears well, in no acute distress, and her   abdomen is nontender  Multiple longitudinal and transverse sections revealed a hernandez   intrauterine pregnancy with the fetus in variable presentation  The   placenta is anterior in implantation, grade 0 in appearance        Cardiac motion was observed at 157 bpm       INDICATIONS      first trimester genetic screening      Exam Types      Level I   sequential screen      RESULTS      Fetus # 1 of 1   Variable presentation   Fetal growth appeared normal      MEASUREMENTS (* Included In Average GA)      CRL              7 1 cm        13 weeks 1 day *   Nuchal Trans    1 40 mm      THE AVERAGE GESTATIONAL AGE is 13 weeks 1 day +/- 7 days  UTERINE ARTERIES                                  S/D   PI    RI    NOTCH       Left Uterine Artery              1 92         No       Right Uterine Artery             2 09         No      ANATOMY COMMENTS      Anatomic detail is limited at this gestational age  The yolk sac was not   noted  The fetal cranium appeared normal in shape and the nuchal   translucency was normal in size (1 4mm)  The nasal bone appears to be   present  The intracranial anatomy was unremarkable  Evaluation of the   spine revealed no obvious evidence for a neural tube defect  Anatomy of   the fetal thorax appeared within normal limits  The cardiac rhythm was   regular  Within the abdomen, stomach & bladder were visualized and the   abdominal wall appeared intact  A three vessel cord appears to be present  Active movement of the fetal body & extremities was seen  There is no   suspicion of a subchorionic bleed  The placental cord insertion was   normal     The uterine artery Dopplers are abnormal for this gestational   age  There is no suspicion of a uterine myoma  Free fluid is not seen in   the posterior cul-de-sac        ADNEXA      The left ovary appeared normal  The right ovary appeared normal and   measured 1 5 x 1 3 x 1 1 cm with a volume of 1 1 cc       AMNIOTIC FLUID         Largest Vertical Pocket = 2 6 cm   Amniotic Fluid: Normal      IMPRESSION      [facility] IUP   13 weeks and 1 day by this ultrasound  (CHARLIE=APR 17 2017)   Variable presentation   Fetal growth appeared normal   Regular fetal heart rate of 157 bpm   Anterior placenta      GENERAL COMMENT      We discussed the options for genetic screening, including but not limited   to first trimester screening, second trimester screening, combined first   and second trimester screening, noninvasive prenatal testing (NIPT) for   patients at high risk and diagnostic screening through the use of CVS and   amniocentesis  We discussed the risks and benefits of each approach   including the sensitivities and false positive rates as well as the   difference between a screening test and a diagnostic test   At the   conclusion of our discussion the patient elected Sequential Screening to   delineate her risk for fetal aneuploidy  A maternal blood sample was   obtained on the day of sonogram   The first trimester portion of the   screening results, encompassing age, nuchal translucency, and biochemistry   should be available within one week of testing and will be reported from   [employer] [place] [address]   The second stage of sequential screening should be completed   between the 15th and 21st week of pregnancy (ideally between 16-18 weeks)  Increased resistance indices are appreciated in both uterine arteries on   today's ultrasound  This finding has been associated with poor placental   perfusion and intrauterine growth restriction  In light of today's   uterine artery findings, recommend initiation of low dose aspirin therapy  A recent meta-analysis yielded risk reductions of 24% for preeclampsia,   20% for intrauterine growth restriction, and 14% for  birth, with   an absolute risk reduction of 2-5% for preeclampsia, one to 5% for   intrauterine growth restriction, and 2-4% for  birth  In this   study, there was no identified risk of harm to the mother or fetus but   long-term evidence was somewhat limited  Given the overall safety profile   and risk-benefit analysis, I recommend an 81 mg aspirin be taken everyday   until approximately 35 weeks  Ideally, low dose aspirin therapy should   commence prior to 16 weeks because if aspirin is taking later, it does not   appear to be as effective  I reviewed these recommendations with the   patient and answered all of her questions to apparent satisfaction  Recommend an anatomy ultrasound be scheduled for 20 weeks gestation   although this was not scheduled in our office  If you would like the   patient have a [facility]  fetal anatomic survey, please refer the   patient back to our office  Please note, in addition to the time spent discussing the results of the   ultrasound, I spent approximately 10 minutes of face-to-face time with the   patient, greater than 50% of which was spent in counseling and the   coordination of care for this patient  Thank you very much for allowing us to participate in the care of this   very nice patient  Should you have any questions, please do not hesitate   to contact our office  Misty Hofmeister, R D M S Verne Cheadle, M D     Electronically signed 10/11/16 10:40

## 2018-01-17 NOTE — PROGRESS NOTES
Chief Complaint  Pt present for OB appointment   TDAP given in left deltoid per pt request  Tolerated well   NDC [id number]   LOT [id number]   EXP 05/11/2019      Active Problems    1  Anxiety disorder (300 00) (F41 9)   2  Anxiety in pregnancy in third trimester, antepartum (648 43,300 00) (O99 343,F41 9)   3  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)   4  Eczema (692 9) (L30 9)   5  Encounter for supervision of normal pregnancy in multigravida in second trimester   (V22 1) (Z34 82)   6  Genital herpes affecting pregnancy in third trimester (647 63,054 10) (O98 313,A60 9)   7  Need for Tdap vaccination (V06 1) (Z23)    Current Meds   1  Multi Prenatal TABS; Therapy: (Recorded:[id number]) to Recorded    Allergies    1  No Known Drug Allergies    2  No Known Environmental Allergies   3  No Known Food Allergies    Vitals  Signs    Systolic: 875  Diastolic: 60  Height: 5 ft 2 5 in  Weight: 130 lb   BMI Calculated: 23 4  BSA Calculated: 1 6  LMP: [id number]    Assessment    1  Anxiety in pregnancy in third trimester, antepartum (648 43,300 00) (O99 343,F41 9)   2  Genital herpes affecting pregnancy in third trimester (647 63,054 10) (O98 313,A60 9)   3  Need for Tdap vaccination (V06 1) (Z23)    Plan  Need for Tdap vaccination    · Tdap (Boostrix)    Future Appointments    Date/Time Provider Specialty Site   03/08/2017 10:00 AM LENIN De Anda   Obstetrics/Gynecology [address] OB/GYN     Signatures   Electronically signed by : LENIN Nichols ; Feb 22 2017  2:30PM EST                       (Validation)

## 2018-01-22 VITALS
DIASTOLIC BLOOD PRESSURE: 64 MMHG | BODY MASS INDEX: 23.57 KG/M2 | SYSTOLIC BLOOD PRESSURE: 114 MMHG | HEIGHT: 63 IN | WEIGHT: 133 LBS

## 2018-01-22 VITALS
DIASTOLIC BLOOD PRESSURE: 62 MMHG | BODY MASS INDEX: 22.33 KG/M2 | WEIGHT: 126.03 LBS | HEIGHT: 63 IN | SYSTOLIC BLOOD PRESSURE: 114 MMHG

## 2018-01-22 VITALS
WEIGHT: 130 LBS | DIASTOLIC BLOOD PRESSURE: 60 MMHG | HEIGHT: 63 IN | SYSTOLIC BLOOD PRESSURE: 100 MMHG | BODY MASS INDEX: 23.04 KG/M2

## 2018-01-22 VITALS
DIASTOLIC BLOOD PRESSURE: 64 MMHG | HEIGHT: 63 IN | SYSTOLIC BLOOD PRESSURE: 112 MMHG | BODY MASS INDEX: 24.27 KG/M2 | WEIGHT: 137 LBS

## 2018-01-22 VITALS
BODY MASS INDEX: 23.74 KG/M2 | DIASTOLIC BLOOD PRESSURE: 70 MMHG | HEIGHT: 63 IN | SYSTOLIC BLOOD PRESSURE: 120 MMHG | WEIGHT: 134 LBS

## 2018-01-22 VITALS
DIASTOLIC BLOOD PRESSURE: 60 MMHG | SYSTOLIC BLOOD PRESSURE: 114 MMHG | BODY MASS INDEX: 23.04 KG/M2 | HEIGHT: 63 IN | WEIGHT: 130 LBS

## 2018-01-22 VITALS
HEIGHT: 63 IN | DIASTOLIC BLOOD PRESSURE: 60 MMHG | WEIGHT: 126 LBS | BODY MASS INDEX: 22.32 KG/M2 | SYSTOLIC BLOOD PRESSURE: 114 MMHG

## 2018-01-22 VITALS
WEIGHT: 132 LBS | BODY MASS INDEX: 23.39 KG/M2 | SYSTOLIC BLOOD PRESSURE: 118 MMHG | DIASTOLIC BLOOD PRESSURE: 64 MMHG | HEIGHT: 63 IN

## 2018-01-22 VITALS
SYSTOLIC BLOOD PRESSURE: 112 MMHG | BODY MASS INDEX: 20.38 KG/M2 | WEIGHT: 115 LBS | HEIGHT: 63 IN | DIASTOLIC BLOOD PRESSURE: 78 MMHG

## 2018-01-29 RX ORDER — PNV NO.95/FERROUS FUM/FOLIC AC 28MG-0.8MG
TABLET ORAL
COMMUNITY
End: 2018-09-12

## 2018-01-30 ENCOUNTER — OFFICE VISIT (OUTPATIENT)
Dept: FAMILY MEDICINE CLINIC | Facility: CLINIC | Age: 28
End: 2018-01-30
Payer: COMMERCIAL

## 2018-01-30 VITALS
BODY MASS INDEX: 19.32 KG/M2 | TEMPERATURE: 98 F | HEART RATE: 68 BPM | HEIGHT: 62 IN | WEIGHT: 105 LBS | SYSTOLIC BLOOD PRESSURE: 144 MMHG | DIASTOLIC BLOOD PRESSURE: 78 MMHG | OXYGEN SATURATION: 99 %

## 2018-01-30 DIAGNOSIS — Z23 NEED FOR INFLUENZA VACCINATION: Primary | ICD-10-CM

## 2018-01-30 DIAGNOSIS — R00.2 HEART PALPITATIONS: ICD-10-CM

## 2018-01-30 DIAGNOSIS — F41.9 ANXIETY: ICD-10-CM

## 2018-01-30 PROCEDURE — 3008F BODY MASS INDEX DOCD: CPT | Performed by: FAMILY MEDICINE

## 2018-01-30 PROCEDURE — 99214 OFFICE O/P EST MOD 30 MIN: CPT | Performed by: FAMILY MEDICINE

## 2018-01-30 RX ORDER — CITALOPRAM 20 MG/1
10 TABLET ORAL DAILY
Qty: 30 TABLET | Refills: 0 | Status: SHIPPED | OUTPATIENT
Start: 2018-01-30 | End: 2018-05-15 | Stop reason: SDUPTHER

## 2018-01-30 NOTE — PROGRESS NOTES
Assessment/Plan:    79-year-old woman with: Anxiety and heart palpitations  Discussed workup and treatment options at length  Encouraged stress reduction and also healthy diet and limiting salt intake and exercise as adjunct to help her mood  Will give paper script to begin Celexa to help her mood once she is done breastfeeding  Will check blood work and 48 hour Holter monitor and patient to return in 2-3 weeks to discuss test results  30 minutes were spent in this office visit, with 20 minutes discussing and counseling the patient  No problem-specific Assessment & Plan notes found for this encounter  Diagnoses and all orders for this visit:    Need for influenza vaccination  -     Flu vaccine greater than or equal to 4yo preservative free IM  -     CBC and differential; Future  -     Comprehensive metabolic panel; Future  -     TSH, 3rd generation with T4 reflex; Future  -     Sedimentation rate, automated; Future  -     C-reactive protein; Future  -     KIERSTEN Screen w/ Reflex to Titer/Pattern; Future  -     Holter monitor - 48 hour; Future    Anxiety  -     citalopram (CELEXA) 20 mg tablet; Take 0 5 tablets (10 mg total) by mouth daily  -     CBC and differential; Future  -     Comprehensive metabolic panel; Future  -     TSH, 3rd generation with T4 reflex; Future  -     Sedimentation rate, automated; Future  -     C-reactive protein; Future  -     KIERSTEN Screen w/ Reflex to Titer/Pattern; Future  -     Holter monitor - 48 hour; Future    Heart palpitations  -     CBC and differential; Future  -     Comprehensive metabolic panel; Future  -     TSH, 3rd generation with T4 reflex; Future  -     Sedimentation rate, automated; Future  -     C-reactive protein; Future  -     KIERSTEN Screen w/ Reflex to Titer/Pattern; Future  -     Holter monitor - 48 hour; Future    Other orders  -     sertraline (ZOLOFT) 50 mg tablet;  Take 1 tablet by mouth daily  -     Prenatal Vit-Fe Fumarate-FA (PRENATAL VITAMIN) 27-0 8 MG TABS; Take by mouth          Subjective:     Chief complaint:  Heart palpitations     Patient ID: Esperanza Chatterjee is a 28 y o  female  Patient is a 76-year-old woman who presents for follow-up visit  Patient has been having worsening anxiety and heart palpitations  Patient is 9 months postpartum  She previously was given a script for Zoloft from her obstetrician but as she felt her palpitations were worsening from it she stopped the medication  No suicidal homicidal ideation  Are worse at bedtime when she is trying to sleep  Patient also notes that her blood pressure has been somewhat elevated but she has been stressed of late  The following portions of the patient's history were reviewed and updated as appropriate: allergies, current medications, past family history, past medical history, past social history, past surgical history and problem list     Review of Systems   Constitutional: Negative  HENT: Negative  Eyes: Negative  Respiratory: Negative  Cardiovascular: Negative  Gastrointestinal: Negative  Endocrine: Negative  Genitourinary: Negative  Musculoskeletal: Negative  Allergic/Immunologic: Negative  Neurological: Negative  Hematological: Negative  Psychiatric/Behavioral: Negative  All other systems reviewed and are negative  Objective:     Physical Exam   Constitutional: She is oriented to person, place, and time  She appears well-developed and well-nourished  HENT:   Head: Atraumatic  Right Ear: External ear normal    Left Ear: External ear normal    Eyes: Conjunctivae and EOM are normal  Pupils are equal, round, and reactive to light  Neck: Normal range of motion  Cardiovascular: Normal rate, regular rhythm and normal heart sounds  Pulmonary/Chest: Effort normal and breath sounds normal  No respiratory distress  Abdominal: Soft  Bowel sounds are normal  She exhibits no distension  There is no tenderness   There is no rebound and no guarding  Musculoskeletal: Normal range of motion  Neurological: She is alert and oriented to person, place, and time  No cranial nerve deficit  Skin: Skin is warm and dry  Psychiatric: She has a normal mood and affect   Her behavior is normal  Judgment and thought content normal

## 2018-03-07 NOTE — PROGRESS NOTES
Education  [employer] Education Post Partum:   Post Partum Education provided:   benefits of breastfeeding, importance of exclusive breastfeeding, exclusive breastfeeding for the first 6 months, importance of breastfeeding after 6 months following introduction of other foods, frequent feedings for optimal milk production, feeding on demand/baby-led feedings and effective positioning and attachment  The patient is breastfeeding  The patient is planning on exclusively breastfeeding until the baby is 10months of age  The patient is not formula feeding  The baby has seen a pediatrician   The pediatrician is in the network Pediatrician name: Dr Joni Quiroz partum education provided by: Radha Castellanos   Electronically signed by : LENIN Rivera ; May 30 2017  4:19PM EST                       (Administrative)

## 2018-03-07 NOTE — PROGRESS NOTES
Education  [employer] Education 1st Trimester:   First Trimester Education provided:  The patient is planning on breastfeeding  [employer] Education 2nd Trimester:   Second Trimester Education provided: benefits of breastfeeding, importance of exclusive breastfeeding, early initiation of breastfeeding, exclusive breastfeeding for the first 6 months, non-pharmacologic pain relief methods for labor and rooming-in on 24 hour basis  Mother has not registered for prenatal class  Thought has not been given to selecting a pediatrician  The mother has not discussed personal preferences with her provider  Prenatal education provided by: Melyssa Romero Date: 11/30/2016        Signatures   Electronically signed by : LENIN Mcdaniel ; Nov 30 2016 10:56AM EST                       (Author)

## 2018-03-07 NOTE — PROGRESS NOTES
Education  [employer] Education 3rd Trimester: Third Trimester Education provided:   benefits of breastfeeding, importance of exclusive breastfeeding, early initiation of breastfeeding, exclusive breastfeeding for the first 6 months, frequent feedings for optimal milk production, feeding on demand/baby-led feedings, effective positioning and attachment, importance of breastfeeding after 6 months following introduction of other foods, non-pharmacologic pain relief methods for labor, importance of early skin-to-skin contact, 28 week packet given and Other education given:   rooming-in on 24 hour basis Pregnancy Essentials Reference Guide given   The patient is planning on breastfeeding  The patient is planning on exclusively breastfeeding until the baby is 10months of age  Mother has not registered for prenatal class  Thought has been given to selecting a pediatrician  The mother has discussed personal preferences with her provider     Prenatal education provided by: Adriane Recio MA      Signatures   Electronically signed by : LENIN Watson ; Jan 25 2017 11:48AM EST                       (Administrative)

## 2018-03-07 NOTE — PROGRESS NOTES
History of Present Illness    Revaccination   Vaccine Information: Vaccine(s) Given (names): Tenivac  Other Information: [id number] JT Per Dr Jazzmine Bergeron pt does not need revac of Tenivac because she had a viable Adacel in 2013  Revaccination Completed: Revaccination not indicated per guidelines  Active Problems    1  Anxiety disorder (300 00) (F41 9)   2  Anxiety during pregnancy, antepartum, second trimester (648 43,300 00)   (O99 342,F41 9)   3  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)   4  Eczema (692 9) (L30 9)   5  Encounter for supervision of normal pregnancy in multigravida in second trimester   (V22 1) (Z34 82)   6  Genital herpes affecting pregnancy in second trimester (647 63,054 10) (O98 312,A60 9)   7  Need for revaccination (V05 9) (Z23)    Immunizations  DTP/DTaP --- Lyly [id number] Michaels: 1989; Abbie Mckenna: 1989; Series4:  19-Sep-1990; Series5: 1994   Hepatitis B --- Lyly [id number] Michaels: 1994; Series3: 1995   HIB --- Lyly Ruffin-Sep-1990   Influenza --- Series1: 26-Oct-2013   MMR --- Series1: 1990; [place] Fails: [id number] Ok: 14-Aug-2013   Polio --- Lyly Ruffin-May-1989; [place] Fails: 1989; Abbie Musca: 19-Sep-1990; Series4: 1994   PPD --- Lyly [id number] Michaels: 10-Jul-2013   Td/DT --- Series1: 2000; Series2: 07-May-2014   Tdap --- Lyly Ruffin2013   Tubersol 5 UNIT/0 1ML Intradermal Solution --- Lyly Ruffin-Aug-2014   Varicella --- Series1: 14-Aug-2013     Current Meds   1  Multi Prenatal TABS    Allergies    1  No Known Drug Allergies    2  No Known Environmental Allergies   3  No Known Food Allergies    Future Appointments    Date/Time Provider Specialty Site   2016 09:15 AM LENIN Hogan   Obstetrics/Gynecology [address] OB/GYN     Signatures   Electronically signed by : Ar King DO; Dec 19 2016  7:48AM EST

## 2018-03-12 ENCOUNTER — OFFICE VISIT (OUTPATIENT)
Dept: URGENT CARE | Facility: MEDICAL CENTER | Age: 28
End: 2018-03-12
Payer: COMMERCIAL

## 2018-03-12 VITALS
OXYGEN SATURATION: 100 % | TEMPERATURE: 99.8 F | BODY MASS INDEX: 19.32 KG/M2 | DIASTOLIC BLOOD PRESSURE: 62 MMHG | SYSTOLIC BLOOD PRESSURE: 110 MMHG | RESPIRATION RATE: 20 BRPM | HEIGHT: 62 IN | HEART RATE: 92 BPM | WEIGHT: 105 LBS

## 2018-03-12 DIAGNOSIS — J02.9 SORE THROAT: ICD-10-CM

## 2018-03-12 DIAGNOSIS — J02.0 STREP PHARYNGITIS: Primary | ICD-10-CM

## 2018-03-12 LAB — S PYO AG THROAT QL: POSITIVE

## 2018-03-12 PROCEDURE — S9088 SERVICES PROVIDED IN URGENT: HCPCS | Performed by: NURSE PRACTITIONER

## 2018-03-12 PROCEDURE — 87430 STREP A AG IA: CPT | Performed by: NURSE PRACTITIONER

## 2018-03-12 PROCEDURE — 99214 OFFICE O/P EST MOD 30 MIN: CPT | Performed by: NURSE PRACTITIONER

## 2018-03-12 RX ORDER — AMOXICILLIN 875 MG/1
875 TABLET, COATED ORAL 2 TIMES DAILY
Qty: 20 TABLET | Refills: 0 | Status: SHIPPED | OUTPATIENT
Start: 2018-03-12 | End: 2018-03-22

## 2018-03-12 NOTE — PATIENT INSTRUCTIONS
May alternate Tylenol and Ibuprofen as needed  Encourage fluids and rest    Saline nasal spray as needed  Humidify bedroom  Salt water gargles  Chloraseptic spray and lozenges as needed  Complete course of antibiotics as directed  Viscous lidocaine gargles as directed  F/U with PCP if symptoms persist/worsen or go to nearest emergency department if any signs of distress  Pharyngitis   AMBULATORY CARE:   Pharyngitis , or sore throat, is inflammation of the tissues and structures in your pharynx (throat)  Pharyngitis is most often caused by bacteria  It may also be caused by a cold or flu virus  Other causes include smoking, allergies, or acid reflux  Signs and symptoms that may occur with pharyngitis:   · Sore throat or pain when you swallow    · Fever, chills, and body aches    · Hoarse or raspy voice    · Cough, runny or stuffy nose, itchy or watery eyes    · Headache    · Upset stomach and loss of appetite    · Mild neck stiffness    · Swollen glands that feel like hard lumps when you touch your neck    · White and yellow pus-filled blisters in the back of your throat  Call 911 for any of the following:   · You have trouble breathing or swallowing because your throat is swollen or sore  Seek care immediately if:   · You are drooling because it hurts too much to swallow  · Your fever is higher than 102? F (39?C) or lasts longer than 3 days  · You are confused  · You taste blood in your throat  Contact your healthcare provider if:   · Your throat pain gets worse  · You have a painful lump in your throat that does not go away after 5 days  · Your symptoms do not improve after 5 days  · You have questions or concerns about your condition or care  Treatment for pharyngitis:  Viral pharyngitis will go away on its own without treatment  Your sore throat should start to feel better in 3 to 5 days for both viral and bacterial infections   You may need any of the following:  · Antibiotics  treat a bacterial infection  · NSAIDs , such as ibuprofen, help decrease swelling, pain, and fever  NSAIDs can cause stomach bleeding or kidney problems in certain people  If you take blood thinner medicine, always ask your healthcare provider if NSAIDs are safe for you  Always read the medicine label and follow directions  · Acetaminophen  decreases pain and fever  It is available without a doctor's order  Ask how much to take and how often to take it  Follow directions  Acetaminophen can cause liver damage if not taken correctly  Manage your symptoms:   · Gargle salt water  Mix ¼ teaspoon salt in an 8 ounce glass of warm water and gargle  This may help decrease swelling in your throat  · Drink liquids as directed  You may need to drink more liquids than usual  Liquids may help soothe your throat and prevent dehydration  Ask how much liquid to drink each day and which liquids are best for you  · Use a cool-steam humidifier  to help moisten the air in your room and calm your cough  · Soothe your throat  with cough drops, ice, soft foods, or popsicles  Prevent the spread of pharyngitis:  Cover your mouth and nose when you cough or sneeze  Do not share food or drinks  Wash your hands often  Use soap and water  If soap and water are unavailable, use an alcohol based hand   Follow up with your healthcare provider as directed:  Write down your questions so you remember to ask them during your visits  © 2017 [employer] Information is for End User's use only and may not be sold, redistributed or otherwise used for commercial purposes  All illustrations and images included in CareNotes® are the copyrighted property of A D A M [employer] Inc  or Caio Zhang  The above information is an  only  It is not intended as medical advice for individual conditions or treatments   Talk to your doctor, nurse or pharmacist before following any medical regimen to see if it is safe and effective for you

## 2018-03-12 NOTE — PROGRESS NOTES
[id number]        NAME: Anand Moreno is a 28 y o  female  : 1990    MRN: [id number]  DATE: 2018  TIME: 5:56 PM    Assessment and Plan   Strep pharyngitis [J02 0]  1  Strep pharyngitis  amoxicillin (AMOXIL) 875 mg tablet    lidocaine viscous (XYLOCAINE) 2 % mucosal solution   2  Sore throat  POCT rapid strepA    lidocaine viscous (XYLOCAINE) 2 % mucosal solution         Patient Instructions   In office rapid strep positive, recommend course of antibiotics as directed  May alternate Tylenol and Ibuprofen as needed  Encourage fluids and rest    Saline nasal spray as needed  Humidify bedroom  Salt water gargles  Chloraseptic spray and lozenges as needed  Complete course of antibiotics as directed  Viscous lidocaine gargles as directed  Follow up with PCP in 5-7 days  Proceed to  ER if symptoms worsen  Chief Complaint     Chief Complaint   Patient presents with    Sore Throat     for 2 days    Fever         History of Present Illness       Patient presents with sore throat, fever x 2 days  No history of Asthma  Nonsmoker  No seasonal allergies  + flu vaccine this season  Has been taking tylenol  + strep as a child  Daughter with URI  Review of Systems   Review of Systems   Constitutional: Positive for chills and fever  HENT: Positive for ear pain, sore throat and trouble swallowing  Negative for congestion, rhinorrhea, sinus pain and sinus pressure  Respiratory: Negative  Cardiovascular: Negative  Gastrointestinal: Negative  Musculoskeletal: Negative for myalgias  Skin: Negative for rash           Current Medications       Current Outpatient Prescriptions:     acetaminophen (TYLENOL) 325 mg tablet, Take 2 tablets by mouth every 4 (four) hours as needed for headaches, Disp: 30 tablet, Rfl: 0    Prenatal Vit-Fe Fumarate-FA (PRENATAL VITAMIN) 27-0 8 MG TABS, Take by mouth, Disp: , Rfl:     amoxicillin (AMOXIL) 875 mg tablet, Take 1 tablet (875 mg total) by mouth 2 (two) times a day for 10 days, Disp: 20 tablet, Rfl: 0    benzocaine-menthol-lanolin-aloe (DERMOPLAST) 20-0 5 % topical spray, Apply 1 application topically 4 (four) times a day as needed for mild pain or irritation for up to 30 days, Disp: 1 each, Rfl: 0    citalopram (CELEXA) 20 mg tablet, Take 0 5 tablets (10 mg total) by mouth daily, Disp: 30 tablet, Rfl: 0    docusate sodium (COLACE) 100 mg capsule, Take 1 capsule by mouth 2 (two) times a day for 30 days, Disp: 60 capsule, Rfl: 0    hydrocortisone 1 % cream, Apply 1 application topically 4 (four) times a day as needed for irritation for up to 30 days, Disp: 30 g, Rfl: 0    ibuprofen (MOTRIN) 600 mg tablet, Take 1 tablet by mouth every 6 (six) hours as needed for mild pain for up to 30 days, Disp: 30 tablet, Rfl: 0    lidocaine viscous (XYLOCAINE) 2 % mucosal solution, Swish and spit 10 mL 4 (four) times a day as needed for mild pain, Disp: 100 mL, Rfl: 0    Prenatal Vit-Fe Fumarate-FA (PRENATAL VITAMIN) 27-0 8 MG TABS, Take 1 tablet by mouth daily, Disp: , Rfl:     sertraline (ZOLOFT) 50 mg tablet, Take 1 tablet by mouth daily, Disp: , Rfl:     witch hazel-glycerin (TUCKS) topical pad, Apply 1 pad topically as needed for irritation for up to 30 days, Disp: 40 each, Rfl: 0    Current Allergies     Allergies as of 03/12/2018    (No Known Allergies)            The following portions of the patient's history were reviewed and updated as appropriate: allergies, current medications, past family history, past medical history, past social history, past surgical history and problem list      Past Medical History:   Diagnosis Date    ADHD (attention deficit hyperactivity disorder)     Anxiety     Chickenpox     Eczema     Herpes genitalis     Varicella        No past surgical history on file      Family History   Problem Relation Age of Onset    Lung cancer Maternal Grandmother     Heart attack Paternal Grandmother     Breast cancer Neg Hx     Ovarian cancer Neg Hx     Colon cancer Neg Hx          Medications have been verified  Objective   /62 (BP Location: Right arm, Patient Position: Sitting, Cuff Size: Standard)   Pulse 92   Temp 99 8 °F (37 7 °C) (Tympanic)   Resp 20   Ht 5' 2" (1 575 m)   Wt 47 6 kg (105 lb)   SpO2 100%   BMI 19 20 kg/m²        Physical Exam     Physical Exam   Constitutional: She is oriented to person, place, and time  Vital signs are normal  She appears well-developed and well-nourished  She is cooperative  Non-toxic appearance  She does not have a sickly appearance  She does not appear ill  No distress  HENT:   Head: Normocephalic and atraumatic  Right Ear: Hearing, tympanic membrane, external ear and ear canal normal    Left Ear: Hearing, tympanic membrane, external ear and ear canal normal    Nose: Nose normal  No mucosal edema, rhinorrhea or sinus tenderness  Right sinus exhibits no maxillary sinus tenderness and no frontal sinus tenderness  Left sinus exhibits no maxillary sinus tenderness and no frontal sinus tenderness  Mouth/Throat: Uvula is midline and mucous membranes are normal  Normal dentition  Oropharyngeal exudate and posterior oropharyngeal erythema present  Grade II tonsils B/L, erythematous and inflamed with exudate noted  Eyes: Conjunctivae, EOM and lids are normal  Pupils are equal, round, and reactive to light  Right eye exhibits no discharge  Left eye exhibits no discharge  Neck: Trachea normal and normal range of motion  No thyroid mass and no thyromegaly present  Cardiovascular: Normal rate, regular rhythm, S1 normal, S2 normal, normal heart sounds, intact distal pulses and normal pulses  Exam reveals no gallop and no friction rub  No murmur heard  Pulmonary/Chest: Effort normal and breath sounds normal  No respiratory distress  She has no wheezes  She has no rhonchi  She has no rales  She exhibits no tenderness     Musculoskeletal: Normal range of motion  She exhibits no edema  Lymphadenopathy:     She has no cervical adenopathy  Neurological: She is alert and oriented to person, place, and time  Skin: Skin is warm and dry  No rash noted  She is not diaphoretic  Psychiatric: She has a normal mood and affect  Her behavior is normal  Thought content normal    Nursing note and vitals reviewed

## 2018-05-15 DIAGNOSIS — F41.9 ANXIETY: ICD-10-CM

## 2018-05-15 RX ORDER — CITALOPRAM 20 MG/1
10 TABLET ORAL DAILY
Qty: 30 TABLET | Refills: 0 | Status: SHIPPED | OUTPATIENT
Start: 2018-05-15 | End: 2018-06-19 | Stop reason: SDUPTHER

## 2018-06-19 DIAGNOSIS — F41.9 ANXIETY: ICD-10-CM

## 2018-06-19 RX ORDER — CITALOPRAM 20 MG/1
10 TABLET ORAL DAILY
Qty: 30 TABLET | Refills: 0 | Status: SHIPPED | OUTPATIENT
Start: 2018-06-19 | End: 2018-07-17 | Stop reason: SDUPTHER

## 2018-06-19 NOTE — TELEPHONE ENCOUNTER
Patient called and LM on the Rx line to request a refill of her Citalopram   I did notice in the last rx refill that it states patient needs an office visit for additional refills  I called the patient and advised her of this and she states she is out of medication, "and it is annoying to have to clear her schedule to come in, and pay her copay, when she does not need any medication changes, just medication refills " I did explain to the patient the importance of documentation in the chart for maintenance medications and touching base with patient's in ov's when a patient is on a maintenance medication such as Citalopram   I offered to schedule the patient a "[facility]" appointment and ask the provider to fill 30 days of medication to hold her over as she is completely out of medication and patient states   "Just ask the provider, since it is really dangerous for me to be off the medication, and I am completely out, and I don't want to schedule an appointment " The last documented OV for the patient was in January and I explained to the patient that she is due to be seen every 6 months, patient states her rational is that she did not start the medication until March, so she is not technically due to come in yet  Please advise if you will refill medication for the patient or if you would like us to call the patient back and schedule an office visit? Thank you

## 2018-06-19 NOTE — TELEPHONE ENCOUNTER
Please call patient  Inform her again that our office policy is that for maintenance medicine we need to see her every 6 months  I am okay giving her a 30 day supply to last until she is able to come in for follow-up visit but we encourage that she return to make sure that she is doing okay on her medication  She can call back with additional questions or concerns

## 2018-07-17 DIAGNOSIS — F41.9 ANXIETY: ICD-10-CM

## 2018-07-17 RX ORDER — CITALOPRAM 20 MG/1
10 TABLET ORAL DAILY
Qty: 30 TABLET | Refills: 0 | Status: SHIPPED | OUTPATIENT
Start: 2018-07-17 | End: 2018-09-12 | Stop reason: SDUPTHER

## 2018-08-20 ENCOUNTER — TRANSCRIBE ORDERS (OUTPATIENT)
Dept: LAB | Facility: HOSPITAL | Age: 28
End: 2018-08-20

## 2018-08-20 ENCOUNTER — APPOINTMENT (OUTPATIENT)
Dept: LAB | Facility: HOSPITAL | Age: 28
End: 2018-08-20

## 2018-08-20 DIAGNOSIS — Z00.8 ENCOUNTER FOR OTHER GENERAL EXAMINATION: ICD-10-CM

## 2018-08-20 DIAGNOSIS — Z00.8 ENCOUNTER FOR OTHER GENERAL EXAMINATION: Primary | ICD-10-CM

## 2018-08-20 LAB
CHOLEST SERPL-MCNC: 162 MG/DL (ref 50–200)
EST. AVERAGE GLUCOSE BLD GHB EST-MCNC: 105 MG/DL
HBA1C MFR BLD: 5.3 % (ref 4.2–6.3)
HDLC SERPL-MCNC: 61 MG/DL (ref 40–60)
LDLC SERPL CALC-MCNC: 82 MG/DL (ref 0–100)
NONHDLC SERPL-MCNC: 101 MG/DL
TRIGL SERPL-MCNC: 97 MG/DL

## 2018-08-20 PROCEDURE — 83036 HEMOGLOBIN GLYCOSYLATED A1C: CPT

## 2018-08-20 PROCEDURE — 36415 COLL VENOUS BLD VENIPUNCTURE: CPT

## 2018-08-20 PROCEDURE — 80061 LIPID PANEL: CPT

## 2018-09-12 ENCOUNTER — OFFICE VISIT (OUTPATIENT)
Dept: FAMILY MEDICINE CLINIC | Facility: CLINIC | Age: 28
End: 2018-09-12
Payer: COMMERCIAL

## 2018-09-12 VITALS
DIASTOLIC BLOOD PRESSURE: 74 MMHG | BODY MASS INDEX: 19.94 KG/M2 | TEMPERATURE: 98.2 F | WEIGHT: 109 LBS | SYSTOLIC BLOOD PRESSURE: 114 MMHG

## 2018-09-12 DIAGNOSIS — F41.9 ANXIETY: Primary | ICD-10-CM

## 2018-09-12 PROCEDURE — 99213 OFFICE O/P EST LOW 20 MIN: CPT | Performed by: FAMILY MEDICINE

## 2018-09-12 RX ORDER — CITALOPRAM 20 MG/1
20 TABLET ORAL DAILY
Qty: 90 TABLET | Refills: 1 | Status: SHIPPED | OUTPATIENT
Start: 2018-09-12 | End: 2019-03-12 | Stop reason: SDUPTHER

## 2018-09-12 NOTE — PROGRESS NOTES
Assessment/Plan:    Patient will increase Celexa 20 mg daily  Refills given at this time  Guidance given overall  Follow-up in 6 months or as needed   Diagnoses and all orders for this visit:    Anxiety  -     citalopram (CELEXA) 20 mg tablet; Take 1 tablet (20 mg total) by mouth daily          Subjective:      Patient ID: Gabriella Suárez is a 33 y o  female  Patient follow-up on anxiety  Patient doing fairly well overall  Patient does have some anxiety at home  No depressive issues  Patient is sleeping fairly well  Appetite is stable overall  No nausea vomiting or diarrhea associated with medication  Patient has been on Celexa since March  Medication Refill         The following portions of the patient's history were reviewed and updated as appropriate: allergies, current medications, past family history, past medical history, past social history, past surgical history and problem list     Review of Systems   Constitutional: Negative  HENT: Negative  Eyes: Negative  Respiratory: Negative  Cardiovascular: Negative  Gastrointestinal: Negative  Endocrine: Negative  Genitourinary: Negative  Musculoskeletal: Negative  Skin: Negative  Allergic/Immunologic: Negative  Neurological: Negative  Hematological: Negative  Psychiatric/Behavioral: Negative  Objective:      /74 (BP Location: Right arm, Patient Position: Sitting, Cuff Size: Standard)   Temp 98 2 °F (36 8 °C) (Tympanic)   Wt 49 4 kg (109 lb)   BMI 19 94 kg/m²          Physical Exam   Constitutional: She is oriented to person, place, and time  She appears well-developed and well-nourished  No distress  HENT:   Head: Normocephalic  Right Ear: External ear normal    Left Ear: External ear normal    Mouth/Throat: Oropharynx is clear and moist  No oropharyngeal exudate  Eyes: EOM are normal  Pupils are equal, round, and reactive to light  Right eye exhibits no discharge   Left eye exhibits no discharge  No scleral icterus  Neck: Normal range of motion  Neck supple  No thyromegaly present  Cardiovascular: Normal rate, regular rhythm, normal heart sounds and intact distal pulses  Exam reveals no gallop and no friction rub  No murmur heard  Pulmonary/Chest: Effort normal and breath sounds normal  No respiratory distress  She has no wheezes  She has no rales  She exhibits no tenderness  Abdominal: Soft  Bowel sounds are normal  She exhibits no distension  There is no tenderness  There is no rebound and no guarding  Musculoskeletal: Normal range of motion  She exhibits no edema or tenderness  Lymphadenopathy:     She has no cervical adenopathy  Neurological: She is oriented to person, place, and time  No cranial nerve deficit  She exhibits normal muscle tone  Coordination normal    Skin: Skin is warm and dry  No rash noted  She is not diaphoretic  No erythema  No pallor  Psychiatric: She has a normal mood and affect  Her behavior is normal  Judgment and thought content normal    Nursing note and vitals reviewed

## 2018-11-02 ENCOUNTER — OFFICE VISIT (OUTPATIENT)
Dept: FAMILY MEDICINE CLINIC | Facility: CLINIC | Age: 28
End: 2018-11-02
Payer: COMMERCIAL

## 2018-11-02 VITALS
WEIGHT: 107 LBS | DIASTOLIC BLOOD PRESSURE: 80 MMHG | TEMPERATURE: 98.3 F | SYSTOLIC BLOOD PRESSURE: 120 MMHG | HEIGHT: 62 IN | BODY MASS INDEX: 19.69 KG/M2

## 2018-11-02 DIAGNOSIS — R39.9 UTI SYMPTOMS: Primary | ICD-10-CM

## 2018-11-02 DIAGNOSIS — A08.4 VIRAL GASTROENTERITIS: ICD-10-CM

## 2018-11-02 LAB
SL AMB  POCT GLUCOSE, UA: NORMAL
SL AMB LEUKOCYTE ESTERASE,UA: ABNORMAL
SL AMB POCT BILIRUBIN,UA: ABNORMAL
SL AMB POCT BLOOD,UA: ABNORMAL
SL AMB POCT CLARITY,UA: ABNORMAL
SL AMB POCT COLOR,UA: ABNORMAL
SL AMB POCT KETONES,UA: ABNORMAL
SL AMB POCT NITRITE,UA: ABNORMAL
SL AMB POCT PH,UA: ABNORMAL
SL AMB POCT SPECIFIC GRAVITY,UA: 1.01
SL AMB POCT URINE PROTEIN: ABNORMAL
SL AMB POCT UROBILINOGEN: NORMAL

## 2018-11-02 PROCEDURE — 87086 URINE CULTURE/COLONY COUNT: CPT | Performed by: FAMILY MEDICINE

## 2018-11-02 PROCEDURE — 99213 OFFICE O/P EST LOW 20 MIN: CPT | Performed by: FAMILY MEDICINE

## 2018-11-02 PROCEDURE — 3008F BODY MASS INDEX DOCD: CPT | Performed by: FAMILY MEDICINE

## 2018-11-02 PROCEDURE — 81002 URINALYSIS NONAUTO W/O SCOPE: CPT | Performed by: FAMILY MEDICINE

## 2018-11-02 PROCEDURE — 1036F TOBACCO NON-USER: CPT | Performed by: FAMILY MEDICINE

## 2018-11-02 RX ORDER — ONDANSETRON 4 MG/1
4 TABLET, ORALLY DISINTEGRATING ORAL EVERY 8 HOURS PRN
Qty: 30 TABLET | Refills: 0 | Status: SHIPPED | OUTPATIENT
Start: 2018-11-02 | End: 2019-04-15 | Stop reason: CLARIF

## 2018-11-02 RX ORDER — NITROFURANTOIN 25; 75 MG/1; MG/1
100 CAPSULE ORAL 2 TIMES DAILY
Qty: 10 CAPSULE | Refills: 0 | Status: SHIPPED | OUTPATIENT
Start: 2018-11-02 | End: 2019-04-15 | Stop reason: CLARIF

## 2018-11-02 NOTE — PROGRESS NOTES
Assessment/Plan:    23-year-old woman with:  UTI and viral gastroenteritis  Discussed supportive care return parameters  Will send urine for culture and empirically treat with Macrobid  Will give Zofran 0 DT to use as needed and encouraged ample p o  liquids with electrolytes  Advised her to call back if symptoms are not improving or worsening    No problem-specific Assessment & Plan notes found for this encounter  Diagnoses and all orders for this visit:    UTI symptoms  -     POCT urine dip  -     nitrofurantoin (MACROBID) 100 mg capsule; Take 1 capsule (100 mg total) by mouth 2 (two) times a day  -     Urine culture; Future  -     Urine culture    Viral gastroenteritis  -     ondansetron (ZOFRAN-ODT) 4 mg disintegrating tablet; Take 1 tablet (4 mg total) by mouth every 8 (eight) hours as needed for nausea          Subjective:   Chief Complaint   Patient presents with    Urinary Tract Infection     x 3-4 days: frequency, pelvic pain, lower back pain, nausea and diarrhea  No treatments used  No blood noticed  Patient ID: Maciej Sommer is a 33 y o  female  Patient is a 23-year-old woman who presents complaining of several days of urinary frequency and burning along with some nausea and some diarrhea  No fevers chills  Tolerating p o  intake  No other complaints at this time      Urinary Tract Infection    Associated symptoms include frequency and nausea  Pertinent negatives include no vomiting  The following portions of the patient's history were reviewed and updated as appropriate: allergies, current medications, past family history, past medical history, past social history, past surgical history and problem list     Review of Systems   Constitutional: Negative  HENT: Negative  Eyes: Negative  Respiratory: Negative  Cardiovascular: Negative  Gastrointestinal: Positive for diarrhea and nausea  Negative for abdominal pain and vomiting  Endocrine: Negative  Genitourinary: Positive for dysuria and frequency  Musculoskeletal: Negative  Allergic/Immunologic: Negative  Neurological: Negative  Hematological: Negative  Psychiatric/Behavioral: Negative  All other systems reviewed and are negative  Objective:      /80 (BP Location: Right arm, Patient Position: Sitting, Cuff Size: Standard)   Temp 98 3 °F (36 8 °C) (Tympanic)   Ht 5' 2" (1 575 m)   Wt 48 5 kg (107 lb)   BMI 19 57 kg/m²          Physical Exam   Constitutional: She is oriented to person, place, and time  She appears well-developed and well-nourished  HENT:   Head: Atraumatic  Right Ear: External ear normal    Left Ear: External ear normal    Eyes: Pupils are equal, round, and reactive to light  Conjunctivae and EOM are normal    Neck: Normal range of motion  Cardiovascular: Normal rate, regular rhythm and normal heart sounds  Pulmonary/Chest: Effort normal and breath sounds normal  No respiratory distress  Abdominal: Soft  She exhibits no distension  There is no tenderness  There is no rebound and no guarding  Musculoskeletal: Normal range of motion  Neurological: She is alert and oriented to person, place, and time  No cranial nerve deficit  Skin: Skin is warm and dry  Psychiatric: She has a normal mood and affect   Her behavior is normal  Judgment and thought content normal

## 2018-11-04 LAB — BACTERIA UR CULT: NORMAL

## 2019-03-12 DIAGNOSIS — F41.9 ANXIETY: ICD-10-CM

## 2019-03-12 RX ORDER — CITALOPRAM 20 MG/1
20 TABLET ORAL DAILY
Qty: 90 TABLET | Refills: 1 | Status: SHIPPED | OUTPATIENT
Start: 2019-03-12 | End: 2019-08-06 | Stop reason: SDUPTHER

## 2019-03-12 NOTE — TELEPHONE ENCOUNTER
Awaiting clarification of medication name from patient  Patient returned called today and stated that medication for refill is Citalopram (CELEXA) 20 mg tabs

## 2019-04-15 ENCOUNTER — OFFICE VISIT (OUTPATIENT)
Dept: FAMILY MEDICINE CLINIC | Facility: CLINIC | Age: 29
End: 2019-04-15
Payer: COMMERCIAL

## 2019-04-15 VITALS
TEMPERATURE: 97.6 F | DIASTOLIC BLOOD PRESSURE: 70 MMHG | SYSTOLIC BLOOD PRESSURE: 110 MMHG | WEIGHT: 111 LBS | BODY MASS INDEX: 20.43 KG/M2 | HEIGHT: 62 IN

## 2019-04-15 DIAGNOSIS — J01.10 ACUTE NON-RECURRENT FRONTAL SINUSITIS: Primary | ICD-10-CM

## 2019-04-15 PROCEDURE — 3008F BODY MASS INDEX DOCD: CPT | Performed by: FAMILY MEDICINE

## 2019-04-15 PROCEDURE — 99213 OFFICE O/P EST LOW 20 MIN: CPT | Performed by: FAMILY MEDICINE

## 2019-04-15 PROCEDURE — 1036F TOBACCO NON-USER: CPT | Performed by: FAMILY MEDICINE

## 2019-04-15 RX ORDER — AMOXICILLIN AND CLAVULANATE POTASSIUM 875; 125 MG/1; MG/1
1 TABLET, FILM COATED ORAL EVERY 12 HOURS SCHEDULED
Qty: 14 TABLET | Refills: 0 | Status: SHIPPED | OUTPATIENT
Start: 2019-04-15 | End: 2019-04-22

## 2019-08-06 DIAGNOSIS — F41.9 ANXIETY: ICD-10-CM

## 2019-08-06 RX ORDER — CITALOPRAM 20 MG/1
20 TABLET ORAL DAILY
Qty: 90 TABLET | Refills: 1 | Status: SHIPPED | OUTPATIENT
Start: 2019-08-06 | End: 2019-08-14 | Stop reason: SDUPTHER

## 2019-08-06 NOTE — TELEPHONE ENCOUNTER
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

## 2019-08-14 ENCOUNTER — OFFICE VISIT (OUTPATIENT)
Dept: FAMILY MEDICINE CLINIC | Facility: CLINIC | Age: 29
End: 2019-08-14
Payer: COMMERCIAL

## 2019-08-14 VITALS
HEIGHT: 62 IN | DIASTOLIC BLOOD PRESSURE: 68 MMHG | TEMPERATURE: 97.7 F | BODY MASS INDEX: 20.06 KG/M2 | SYSTOLIC BLOOD PRESSURE: 112 MMHG | WEIGHT: 109 LBS

## 2019-08-14 DIAGNOSIS — F41.9 ANXIETY: ICD-10-CM

## 2019-08-14 PROCEDURE — 3008F BODY MASS INDEX DOCD: CPT | Performed by: FAMILY MEDICINE

## 2019-08-14 PROCEDURE — 99213 OFFICE O/P EST LOW 20 MIN: CPT | Performed by: FAMILY MEDICINE

## 2019-08-14 PROCEDURE — 1036F TOBACCO NON-USER: CPT | Performed by: FAMILY MEDICINE

## 2019-08-14 RX ORDER — CITALOPRAM 40 MG/1
40 TABLET ORAL DAILY
Qty: 30 TABLET | Refills: 0 | Status: SHIPPED | OUTPATIENT
Start: 2019-08-14 | End: 2019-10-23 | Stop reason: SDUPTHER

## 2019-08-14 NOTE — PROGRESS NOTES
Assessment/Plan:    41-year-old woman with: Anxiety depression  Discussed treatment options with risks and benefits  Will titrate up Celexa to 40 mg daily and encouraged continued lifestyle changes and will reassess in 1 month  Discussed supportive care return parameters otherwise  No problem-specific Assessment & Plan notes found for this encounter  Diagnoses and all orders for this visit:    Anxiety  -     citalopram (CeleXA) 40 mg tablet; Take 1 tablet (40 mg total) by mouth daily          Subjective:     Chief Complaint   Patient presents with    Medication Problem     med change         Patient ID: Gabriella Suárez is a 26 y o  female  Patient is a 41-year-old woman who presents for follow-up on anxiety and depression  She feels that the medication had been helping but that she is having breakthrough symptoms  No suicidal homicidal ideation  She would be opt for try higher dose of the medicine      The following portions of the patient's history were reviewed and updated as appropriate: allergies, current medications, past family history, past medical history, past social history, past surgical history and problem list     Review of Systems   Constitutional: Negative  HENT: Negative  Eyes: Negative  Respiratory: Negative  Cardiovascular: Negative  Gastrointestinal: Negative  Endocrine: Negative  Genitourinary: Negative  Musculoskeletal: Negative  Allergic/Immunologic: Negative  Neurological: Negative  Hematological: Negative  Psychiatric/Behavioral: Positive for dysphoric mood and self-injury  All other systems reviewed and are negative  Objective:      /68 (BP Location: Left arm, Patient Position: Sitting, Cuff Size: Standard)   Temp 97 7 °F (36 5 °C) (Tympanic)   Ht 5' 2" (1 575 m)   Wt 49 4 kg (109 lb)   LMP 08/01/2019 (Approximate)   Breastfeeding?  No   BMI 19 94 kg/m²          Physical Exam   Constitutional: She is oriented to person, place, and time  She appears well-developed and well-nourished  HENT:   Head: Atraumatic  Right Ear: External ear normal    Left Ear: External ear normal    Eyes: Pupils are equal, round, and reactive to light  Conjunctivae and EOM are normal    Neck: Normal range of motion  Pulmonary/Chest: Effort normal  No respiratory distress  Abdominal: She exhibits no distension  Musculoskeletal: Normal range of motion  Neurological: She is alert and oriented to person, place, and time  No cranial nerve deficit  Skin: Skin is warm and dry  Psychiatric: She has a normal mood and affect   Her behavior is normal  Judgment and thought content normal

## 2019-10-23 ENCOUNTER — OFFICE VISIT (OUTPATIENT)
Dept: FAMILY MEDICINE CLINIC | Facility: CLINIC | Age: 29
End: 2019-10-23
Payer: COMMERCIAL

## 2019-10-23 VITALS
HEIGHT: 62 IN | BODY MASS INDEX: 20.24 KG/M2 | SYSTOLIC BLOOD PRESSURE: 110 MMHG | WEIGHT: 110 LBS | DIASTOLIC BLOOD PRESSURE: 64 MMHG | TEMPERATURE: 97.2 F

## 2019-10-23 DIAGNOSIS — F41.9 ANXIETY: ICD-10-CM

## 2019-10-23 PROCEDURE — 99213 OFFICE O/P EST LOW 20 MIN: CPT | Performed by: FAMILY MEDICINE

## 2019-10-23 PROCEDURE — 3008F BODY MASS INDEX DOCD: CPT | Performed by: FAMILY MEDICINE

## 2019-10-23 RX ORDER — CITALOPRAM 40 MG/1
40 TABLET ORAL DAILY
Qty: 90 TABLET | Refills: 1 | Status: SHIPPED | OUTPATIENT
Start: 2019-10-23 | End: 2020-04-30 | Stop reason: SDUPTHER

## 2019-10-24 NOTE — PROGRESS NOTES
Assessment/Plan:    26-year-old woman with: Anxiety  Will continue current dose of Celexa discussed supportive care return parameters will reassess in 6 months  No problem-specific Assessment & Plan notes found for this encounter  Diagnoses and all orders for this visit:    Anxiety  -     citalopram (CeleXA) 40 mg tablet; Take 1 tablet (40 mg total) by mouth daily          Subjective:     Chief Complaint   Patient presents with    Medication Refill        Patient ID: Paul Redmond is a 26 y o  female  Patient is a 26-year-old woman who presents for follow-up on her anxiety  She admits being stable on the current dose of the Celexa and denies other complaints at this time  No fevers chills nausea vomiting  Tolerating p o  intake      The following portions of the patient's history were reviewed and updated as appropriate: allergies, current medications, past family history, past medical history, past social history, past surgical history and problem list     Review of Systems   Constitutional: Negative  HENT: Negative  Eyes: Negative  Respiratory: Negative  Cardiovascular: Negative  Gastrointestinal: Negative  Endocrine: Negative  Genitourinary: Negative  Musculoskeletal: Negative  Allergic/Immunologic: Negative  Neurological: Negative  Hematological: Negative  Psychiatric/Behavioral: Negative  All other systems reviewed and are negative  Objective:      /64 (BP Location: Left arm, Patient Position: Sitting, Cuff Size: Standard)   Temp (!) 97 2 °F (36 2 °C) (Tympanic)   Ht 5' 2" (1 575 m)   Wt 49 9 kg (110 lb)   LMP 10/19/2019 (Exact Date)   Breastfeeding? No   BMI 20 12 kg/m²          Physical Exam   Constitutional: She is oriented to person, place, and time  She appears well-developed and well-nourished  HENT:   Head: Atraumatic     Right Ear: External ear normal    Left Ear: External ear normal    Eyes: Pupils are equal, round, and reactive to light  Conjunctivae and EOM are normal    Neck: Normal range of motion  Cardiovascular: Normal rate, regular rhythm and normal heart sounds  Pulmonary/Chest: Effort normal and breath sounds normal  No respiratory distress  Abdominal: Soft  She exhibits no distension  There is no tenderness  There is no rebound and no guarding  Musculoskeletal: Normal range of motion  Neurological: She is alert and oriented to person, place, and time  No cranial nerve deficit  Skin: Skin is warm and dry  Psychiatric: She has a normal mood and affect   Her behavior is normal  Judgment and thought content normal

## 2019-12-03 ENCOUNTER — OFFICE VISIT (OUTPATIENT)
Dept: OBGYN CLINIC | Facility: MEDICAL CENTER | Age: 29
End: 2019-12-03
Payer: COMMERCIAL

## 2019-12-03 VITALS
WEIGHT: 113 LBS | HEIGHT: 61 IN | SYSTOLIC BLOOD PRESSURE: 128 MMHG | BODY MASS INDEX: 21.34 KG/M2 | DIASTOLIC BLOOD PRESSURE: 98 MMHG

## 2019-12-03 DIAGNOSIS — Z01.419 ENCOUNTER FOR WELL WOMAN EXAM WITH ROUTINE GYNECOLOGICAL EXAM: Primary | ICD-10-CM

## 2019-12-03 DIAGNOSIS — Z30.09 ENCOUNTER FOR COUNSELING REGARDING CONTRACEPTION: ICD-10-CM

## 2019-12-03 DIAGNOSIS — N39.491 COITAL URINARY INCONTINENCE: ICD-10-CM

## 2019-12-03 PROCEDURE — 99395 PREV VISIT EST AGE 18-39: CPT | Performed by: OBSTETRICS & GYNECOLOGY

## 2019-12-03 PROCEDURE — G0145 SCR C/V CYTO,THINLAYER,RESCR: HCPCS | Performed by: OBSTETRICS & GYNECOLOGY

## 2019-12-03 PROCEDURE — 87624 HPV HI-RISK TYP POOLED RSLT: CPT | Performed by: OBSTETRICS & GYNECOLOGY

## 2019-12-03 PROCEDURE — 87086 URINE CULTURE/COLONY COUNT: CPT | Performed by: OBSTETRICS & GYNECOLOGY

## 2019-12-03 NOTE — PROGRESS NOTES
Assessment   26 y o  [id number] with regular menses who is sexually active and currently not using contraception (but considering options after counseling) presenting for annual exam      Plan   Diagnoses and all orders for this visit:    Encounter for well woman exam with routine gynecological exam  -     Liquid-based pap, screening  -     Clinicians breast exam and teaching done  - Return yearly for exam    Coital urinary incontinence  -     Urine culture  - Counseled on no evidence of pelvic organ prolapse  - Educated on [place]  Discussed referral to pelvic PT if not finding relief with these    Encounter for counseling regarding contraception        - Options reviewed, considering IUD        - Will call if decided    __________________________________________________________________      Subjective     Jesse Oreilly is a 26 y o  [id number] with regular menses who is sexually active and currently not using contraception (undecided, options reviewed) presenting for annual exam  She is without complaint  GYN  Complaints: denies  Denies change in menstrual cycle, dysmenorrhea, dyspareunia, genital discharge, genital ulcers, irregular/heavy menses, pelvic pain and vulvar/vaginal symptoms  Periods are regular every 28-30 days, lasting 5 days  Dysmenorrhea:none  Cyclic symptoms include none  Sexually active: Yes - single partner - male  Hx STI: hx of partner with HSV2 but no outbreak   Hx Abnormal pap: denies  Last pap: 2016 - NILM    OB  [id number]   Pregnancy complications: denies  Unsure on future fertility      Complaints: denies  Denies urinary frequency, hematuria, and dysuria  Incontinence with sex; usually small volumes, but sometimes larger       BREAST  Complaints: denies  Denies: breast lump, breast tenderness, changed mole, dryness, nipple discharge, pruritus, rash, skin color change and skin lesion(s)  Last mammogram: n/a  Personal hx: denies  Family hx: denies fhx of breast, uterine, ovarian, or colon cancers  Patient does not do regular self-exams    GENERAL  PMH reviewed/updated and is as below  Patient does follow with a PCP  Works as a P5 RN in [employer]  Denies domestic violence  Exercise: yoga, kids  Diet: nothing specific    SCREENING  Cervical Ca: pap collected  Breast Ca: mammo not indicated, clinicians exam and teaching done  Colon Ca: not indicated by age  Metabolic: per PCP      Past Medical History:   Diagnosis Date    ADHD (attention deficit hyperactivity disorder)     Anxiety     Chickenpox     Eczema     Herpes genitalis     Varicella        History reviewed  No pertinent surgical history  Current Outpatient Medications:     citalopram (CeleXA) 40 mg tablet, Take 1 tablet (40 mg total) by mouth daily, Disp: 90 tablet, Rfl: 1    No Known Allergies    Social History     Tobacco Use    Smoking status: Never Smoker    Smokeless tobacco: Never Used   Substance Use Topics    Alcohol use: No     Comment: social etoh, stopped with + UPT    Drug use: No           Objective  /98   Ht 5' 1" (1 549 m)   Wt 51 3 kg (113 lb)   LMP 11/22/2019   BMI 21 35 kg/m²      Physical Exam:  Physical Exam   Constitutional: She is oriented to person, place, and time  She appears well-developed and well-nourished  No distress  HENT:   Head: Normocephalic and atraumatic  Eyes: No scleral icterus  Cardiovascular: Normal rate  Pulmonary/Chest: Effort normal  No accessory muscle usage  No respiratory distress  Right breast exhibits no inverted nipple, no mass, no nipple discharge, no skin change and no tenderness  Left breast exhibits no inverted nipple, no mass, no nipple discharge, no skin change and no tenderness  Abdominal: Soft  She exhibits no distension and no mass  There is no tenderness  There is no rebound and no guarding  Genitourinary: Uterus normal  Rectal exam shows no external hemorrhoid and anal tone normal  There is no rash, tenderness or lesion on the right labia   There is no rash, tenderness or lesion on the left labia  Uterus is not enlarged, not fixed and not tender  Cervix exhibits no motion tenderness and no discharge  Right adnexum displays no mass, no tenderness and no fullness  Left adnexum displays no mass, no tenderness and no fullness  No erythema or tenderness in the vagina  No signs of injury (well supported pelvic floor without evidence of prolapse) around the vagina  No vaginal discharge found  Genitourinary Comments: Urethral meatus: normal   Musculoskeletal: She exhibits no edema  Neurological: She is alert and oriented to person, place, and time  Skin: Skin is warm and dry  No rash noted  No erythema  Psychiatric: She has a normal mood and affect   Her behavior is normal  Thought content normal

## 2019-12-04 LAB
BACTERIA UR CULT: NORMAL
HPV HR 12 DNA CVX QL NAA+PROBE: NEGATIVE
HPV16 DNA CVX QL NAA+PROBE: NEGATIVE
HPV18 DNA CVX QL NAA+PROBE: NEGATIVE

## 2019-12-05 LAB
LAB AP GYN PRIMARY INTERPRETATION: NORMAL
Lab: NORMAL

## 2019-12-06 ENCOUNTER — TELEPHONE (OUTPATIENT)
Dept: OBGYN CLINIC | Facility: MEDICAL CENTER | Age: 29
End: 2019-12-06

## 2019-12-06 NOTE — TELEPHONE ENCOUNTER
----- Message from Scar Ramsay sent at 12/6/2019  8:35 AM EST -----  Regarding: RE: IUD   I contacted pt's insurance  Effective 1/1/19 and active  Pt is covered for insertion, removal and device at 100%  No PA needed  Gabriella Alan Ref# [id number]     ----- Message -----  From: Tod Fatima  Sent: 12/3/2019   4:55 PM EST  To: Remedios Brannon  Subject: IUD                                              HI,     Pt is s/c for [place] with Arvind Knapp on 12/17/19  She has [place]

## 2019-12-16 ENCOUNTER — TELEPHONE (OUTPATIENT)
Dept: OBGYN CLINIC | Facility: MEDICAL CENTER | Age: 29
End: 2019-12-16

## 2019-12-16 NOTE — TELEPHONE ENCOUNTER
Pt called and cancelled her Mirena insert for tomorrow, she would like to do the oral OCP now  Please call in a script to pharmacy on file  Thank you!

## 2019-12-17 ENCOUNTER — TELEPHONE (OUTPATIENT)
Dept: OBGYN CLINIC | Facility: MEDICAL CENTER | Age: 29
End: 2019-12-17

## 2019-12-17 ENCOUNTER — PROCEDURE VISIT (OUTPATIENT)
Dept: OBGYN CLINIC | Facility: MEDICAL CENTER | Age: 29
End: 2019-12-17
Payer: COMMERCIAL

## 2019-12-17 VITALS
DIASTOLIC BLOOD PRESSURE: 84 MMHG | SYSTOLIC BLOOD PRESSURE: 120 MMHG | BODY MASS INDEX: 22.45 KG/M2 | HEIGHT: 62 IN | WEIGHT: 122 LBS

## 2019-12-17 DIAGNOSIS — Z30.430 ENCOUNTER FOR INSERTION OF MIRENA IUD: Primary | ICD-10-CM

## 2019-12-17 LAB — SL AMB POCT URINE HCG: NEGATIVE

## 2019-12-17 PROCEDURE — 81025 URINE PREGNANCY TEST: CPT | Performed by: OBSTETRICS & GYNECOLOGY

## 2019-12-17 PROCEDURE — 58300 INSERT INTRAUTERINE DEVICE: CPT | Performed by: OBSTETRICS & GYNECOLOGY

## 2019-12-17 NOTE — PROGRESS NOTES
Iud insertions  Date/Time: 12/17/2019 3:52 PM  Performed by: Isela Leigh MD  Authorized by: Isela Leigh MD     Consent:     Consent obtained:  Written    Consent given by:  Patient    Procedure risks and benefits discussed: yes      Patient questions answered: yes      Patient agrees, verbalizes understanding, and wants to proceed: yes    Procedure:     Pelvic exam performed: yes      Negative urine pregnancy test: yes      Cervix cleaned and prepped: yes (betadine x3)      Speculum placed in vagina: yes      Tenaculum applied to cervix: no      Uterus sounded: yes      Uterus sound depth (cm):  8    IUD inserted with no complications: yes      IUD type:  Mirena (Lot No: [id number])    Strings trimmed: yes (3cm)    Post-procedure:     Patient tolerated procedure well: yes      Patient will follow up after next period: yes

## 2019-12-17 NOTE — TELEPHONE ENCOUNTER
----- Message from Kishan Lind RN sent at 12/16/2019 11:06 AM EST -----  Left message on voicemail    ----- Message -----  From: Lenard Sun MD  Sent: 12/16/2019  10:12 AM EST  To: Kishan Lind RN    No problem  Has she tried OCPs before / does she have one she likes? If so, please order those with 3 refills  If not, let me know and I'll send something in  She can start them immediately vs  with the Sunday after she starts her next menses  If she waits until her period, her chances of breakthrough bleeding is decreased because she will be starting them in sync with her natural cycle  Regardless, she should use backup protection for the first cycle  She should follow up in office in 3mo for pill check  ----- Message -----  From: Kishan Lind RN  Sent: 12/16/2019   8:55 AM EST  To: Lenard Sun MD    No longer wants mirena  Desires OCP  What would you like to start?

## 2020-03-17 ENCOUNTER — OFFICE VISIT (OUTPATIENT)
Dept: OBGYN CLINIC | Facility: MEDICAL CENTER | Age: 30
End: 2020-03-17
Payer: COMMERCIAL

## 2020-03-17 VITALS
WEIGHT: 116 LBS | BODY MASS INDEX: 21.35 KG/M2 | HEIGHT: 62 IN | DIASTOLIC BLOOD PRESSURE: 90 MMHG | SYSTOLIC BLOOD PRESSURE: 140 MMHG

## 2020-03-17 DIAGNOSIS — N76.0 BACTERIAL VAGINOSIS: ICD-10-CM

## 2020-03-17 DIAGNOSIS — B96.89 BACTERIAL VAGINOSIS: ICD-10-CM

## 2020-03-17 DIAGNOSIS — N89.8 VAGINAL DISCHARGE: ICD-10-CM

## 2020-03-17 DIAGNOSIS — T83.89XA MENORRHAGIA DUE TO INTRAUTERINE DEVICE (IUD) (HCC): Primary | ICD-10-CM

## 2020-03-17 DIAGNOSIS — N92.0 MENORRHAGIA DUE TO INTRAUTERINE DEVICE (IUD) (HCC): Primary | ICD-10-CM

## 2020-03-17 LAB
BV WHIFF TEST VAG QL: NEGATIVE
CLUE CELLS SPEC QL WET PREP: ABNORMAL
T VAGINALIS VAG QL WET PREP: NEGATIVE
YEAST VAG QL WET PREP: NEGATIVE

## 2020-03-17 PROCEDURE — 58301 REMOVE INTRAUTERINE DEVICE: CPT | Performed by: OBSTETRICS & GYNECOLOGY

## 2020-03-17 PROCEDURE — 87210 SMEAR WET MOUNT SALINE/INK: CPT | Performed by: OBSTETRICS & GYNECOLOGY

## 2020-03-17 PROCEDURE — 1036F TOBACCO NON-USER: CPT | Performed by: OBSTETRICS & GYNECOLOGY

## 2020-03-17 PROCEDURE — 99214 OFFICE O/P EST MOD 30 MIN: CPT | Performed by: OBSTETRICS & GYNECOLOGY

## 2020-03-17 RX ORDER — METRONIDAZOLE 500 MG/1
500 TABLET ORAL EVERY 12 HOURS SCHEDULED
Qty: 14 TABLET | Refills: 0 | Status: SHIPPED | OUTPATIENT
Start: 2020-03-17 | End: 2020-03-24

## 2020-03-17 NOTE — PROGRESS NOTES
Iud removal  Date/Time: 3/17/2020 3:45 PM  Performed by: Brittany Churchill MD  Authorized by: Brittany Churchill MD     Consent:     Consent obtained:  Written    Consent given by:  Patient    Procedure risks and benefits discussed: yes      Patient questions answered: yes      Patient agrees, verbalizes understanding, and wants to proceed: yes    Procedure:     Removed with no complications: yes      Other reason for removal:  Patient request; increasing menstrual bleeding and dyspareunia

## 2020-03-17 NOTE — PROGRESS NOTES
Assessment:  26 y o  [id number] who presents as a follow up for IUD string check  Plan:  Diagnoses and all orders for this visit:    Menorrhagia due to intrauterine device (IUD) ([address] )  - Discussed apparent well positioned IUD by exam  Discussed transition period while on IUD, which can last 3-6mo  Advised reasonable to wait 3mo longer prior to discontinuation  Declines and prefers to have removed  - IUD removed at patient request     Bacterial vaginosis  -     metroNIDAZOLE (FLAGYL) 500 mg tablet; Take 1 tablet (500 mg total) by mouth every 12 (twelve) hours for 7 days        __________________________________________________________________      Subjective   Ludy Grant is a 26 y o  [id number] who presents as a follow up for IUD string check  Patient reports she is unhappy with IUD overall  Notes she expected lighter menses, but finds her periods to be reguar but heavier and longer  Typically had 5d of light non-painful bleeding, but now 9d and heavier  Doesn't seem to be improving; each cycle is progressively heavier  Wearing a tampon and pad for backup  Also has cramping, which she doesn't typically have  Feels device with sex  Also notes a heavier discharge without odor or irritation  Patient wants to have device removed  We reviewed typical transition period of 3-6mo when starting IUD use, during which time menses can be irregular  Advised that I recommend maintaining for 3mo more, and if still not satisfied with method its reasonable to remove  Patient prefers removal at this time  Notes she will continue using condoms and her partner is considering vasectomy  The following portions of the patient's history were reviewed and updated as appropriate: allergies, current medications, past medical history and problem list     Review of Systems  Review of Systems   Constitutional: Negative for chills, fatigue and fever  Gastrointestinal: Negative for abdominal pain and nausea     Genitourinary: Positive for dyspareunia, genital sores, menstrual problem and vaginal discharge  Negative for pelvic pain and vaginal pain  Skin: Negative for color change and rash  Objective  /90 (BP Location: Right arm, Patient Position: Sitting, Cuff Size: Large)   Ht 5' 2" (1 575 m)   Wt 52 6 kg (116 lb)   LMP 03/06/2020 (Approximate)   BMI 21 22 kg/m²      Physical Exam:  Physical Exam   Constitutional: She is oriented to person, place, and time  She appears well-developed and well-nourished  No distress  HENT:   Head: Normocephalic and atraumatic  Eyes: No scleral icterus  Cardiovascular: Normal rate  Pulmonary/Chest: Effort normal  No accessory muscle usage  No respiratory distress  Abdominal: Soft  She exhibits no distension  There is no tenderness  There is no rebound and no guarding  Genitourinary: There is no rash, tenderness or lesion on the right labia  There is no rash, tenderness or lesion on the left labia  Cervix exhibits no motion tenderness (IUD strings visible, 3cm as previously documented), no discharge and no friability  No erythema, tenderness or bleeding in the vagina  No signs of injury around the vagina  Vaginal discharge (heavy, thin grey-yellow vaginal discharge) found  Musculoskeletal: She exhibits no edema or tenderness  Neurological: She is alert and oriented to person, place, and time  Skin: Skin is warm and dry  No rash noted  No erythema  Psychiatric: She has a normal mood and affect   Her behavior is normal

## 2020-04-30 DIAGNOSIS — F41.9 ANXIETY: ICD-10-CM

## 2020-04-30 RX ORDER — CITALOPRAM 40 MG/1
40 TABLET ORAL DAILY
Qty: 90 TABLET | Refills: 1 | Status: SHIPPED | OUTPATIENT
Start: 2020-04-30 | End: 2020-11-09 | Stop reason: SDUPTHER

## 2020-11-09 DIAGNOSIS — F41.9 ANXIETY: ICD-10-CM

## 2020-11-09 RX ORDER — CITALOPRAM 40 MG/1
40 TABLET ORAL DAILY
Qty: 7 TABLET | Refills: 0 | Status: SHIPPED | OUTPATIENT
Start: 2020-11-09 | End: 2020-11-11 | Stop reason: SDUPTHER

## 2020-11-11 ENCOUNTER — OFFICE VISIT (OUTPATIENT)
Dept: FAMILY MEDICINE CLINIC | Facility: CLINIC | Age: 30
End: 2020-11-11
Payer: COMMERCIAL

## 2020-11-11 VITALS
HEIGHT: 62 IN | BODY MASS INDEX: 21.9 KG/M2 | SYSTOLIC BLOOD PRESSURE: 150 MMHG | WEIGHT: 119 LBS | DIASTOLIC BLOOD PRESSURE: 88 MMHG

## 2020-11-11 DIAGNOSIS — F41.9 ANXIETY: ICD-10-CM

## 2020-11-11 DIAGNOSIS — I10 ESSENTIAL HYPERTENSION: Primary | ICD-10-CM

## 2020-11-11 PROCEDURE — 99213 OFFICE O/P EST LOW 20 MIN: CPT | Performed by: FAMILY MEDICINE

## 2020-11-11 RX ORDER — HYDROCHLOROTHIAZIDE 12.5 MG/1
12.5 TABLET ORAL DAILY
Qty: 30 TABLET | Refills: 0 | Status: SHIPPED | OUTPATIENT
Start: 2020-11-11 | End: 2020-12-15 | Stop reason: SDUPTHER

## 2020-11-11 RX ORDER — CITALOPRAM 40 MG/1
40 TABLET ORAL DAILY
Qty: 90 TABLET | Refills: 1 | Status: SHIPPED | OUTPATIENT
Start: 2020-11-11 | End: 2021-02-13 | Stop reason: SDUPTHER

## 2020-12-08 ENCOUNTER — TELEPHONE (OUTPATIENT)
Dept: PSYCHIATRY | Facility: CLINIC | Age: 30
End: 2020-12-08

## 2020-12-15 DIAGNOSIS — I10 ESSENTIAL HYPERTENSION: ICD-10-CM

## 2020-12-15 RX ORDER — HYDROCHLOROTHIAZIDE 12.5 MG/1
12.5 TABLET ORAL DAILY
Qty: 30 TABLET | Refills: 0 | Status: SHIPPED | OUTPATIENT
Start: 2020-12-15 | End: 2021-01-14 | Stop reason: SDUPTHER

## 2020-12-18 ENCOUNTER — TELEPHONE (OUTPATIENT)
Dept: FAMILY MEDICINE CLINIC | Facility: CLINIC | Age: 30
End: 2020-12-18

## 2020-12-18 DIAGNOSIS — Z20.822 EXPOSURE TO SEVERE ACUTE RESPIRATORY SYNDROME CORONAVIRUS 2 (SARS-COV-2): Primary | ICD-10-CM

## 2020-12-18 DIAGNOSIS — Z20.822 EXPOSURE TO SEVERE ACUTE RESPIRATORY SYNDROME CORONAVIRUS 2 (SARS-COV-2): ICD-10-CM

## 2020-12-18 PROCEDURE — U0003 INFECTIOUS AGENT DETECTION BY NUCLEIC ACID (DNA OR RNA); SEVERE ACUTE RESPIRATORY SYNDROME CORONAVIRUS 2 (SARS-COV-2) (CORONAVIRUS DISEASE [COVID-19]), AMPLIFIED PROBE TECHNIQUE, MAKING USE OF HIGH THROUGHPUT TECHNOLOGIES AS DESCRIBED BY CMS-2020-01-R: HCPCS | Performed by: FAMILY MEDICINE

## 2020-12-19 ENCOUNTER — TELEPHONE (OUTPATIENT)
Dept: OTHER | Facility: OTHER | Age: 30
End: 2020-12-19

## 2020-12-19 LAB — SARS-COV-2 RNA SPEC QL NAA+PROBE: NOT DETECTED

## 2020-12-28 ENCOUNTER — IMMUNIZATIONS (OUTPATIENT)
Dept: FAMILY MEDICINE CLINIC | Facility: HOSPITAL | Age: 30
End: 2020-12-28
Payer: COMMERCIAL

## 2020-12-28 DIAGNOSIS — Z23 ENCOUNTER FOR IMMUNIZATION: ICD-10-CM

## 2020-12-28 PROCEDURE — 91301 SARS-COV-2 / COVID-19 MRNA VACCINE (MODERNA) 100 MCG: CPT

## 2020-12-28 PROCEDURE — 0011A SARS-COV-2 / COVID-19 MRNA VACCINE (MODERNA) 100 MCG: CPT

## 2021-01-14 DIAGNOSIS — I10 ESSENTIAL HYPERTENSION: ICD-10-CM

## 2021-01-14 RX ORDER — HYDROCHLOROTHIAZIDE 12.5 MG/1
12.5 TABLET ORAL DAILY
Qty: 30 TABLET | Refills: 0 | Status: SHIPPED | OUTPATIENT
Start: 2021-01-14 | End: 2021-02-13 | Stop reason: SDUPTHER

## 2021-01-25 ENCOUNTER — LAB (OUTPATIENT)
Dept: LAB | Facility: HOSPITAL | Age: 31
End: 2021-01-25
Payer: COMMERCIAL

## 2021-01-25 ENCOUNTER — IMMUNIZATIONS (OUTPATIENT)
Dept: FAMILY MEDICINE CLINIC | Facility: HOSPITAL | Age: 31
End: 2021-01-25

## 2021-01-25 DIAGNOSIS — F41.9 ANXIETY: ICD-10-CM

## 2021-01-25 DIAGNOSIS — I10 ESSENTIAL HYPERTENSION: ICD-10-CM

## 2021-01-25 DIAGNOSIS — Z23 ENCOUNTER FOR IMMUNIZATION: Primary | ICD-10-CM

## 2021-01-25 LAB
ALBUMIN SERPL BCP-MCNC: 4.1 G/DL (ref 3.5–5)
ALP SERPL-CCNC: 21 U/L (ref 46–116)
ALT SERPL W P-5'-P-CCNC: 25 U/L (ref 12–78)
ANION GAP SERPL CALCULATED.3IONS-SCNC: 6 MMOL/L (ref 4–13)
AST SERPL W P-5'-P-CCNC: 22 U/L (ref 5–45)
BASOPHILS # BLD AUTO: 0.03 THOUSANDS/ΜL (ref 0–0.1)
BASOPHILS NFR BLD AUTO: 1 % (ref 0–1)
BILIRUB SERPL-MCNC: 0.34 MG/DL (ref 0.2–1)
BUN SERPL-MCNC: 14 MG/DL (ref 5–25)
CALCIUM SERPL-MCNC: 9.3 MG/DL (ref 8.3–10.1)
CHLORIDE SERPL-SCNC: 101 MMOL/L (ref 100–108)
CO2 SERPL-SCNC: 32 MMOL/L (ref 21–32)
CREAT SERPL-MCNC: 0.73 MG/DL (ref 0.6–1.3)
EOSINOPHIL # BLD AUTO: 0.15 THOUSAND/ΜL (ref 0–0.61)
EOSINOPHIL NFR BLD AUTO: 3 % (ref 0–6)
ERYTHROCYTE [DISTWIDTH] IN BLOOD BY AUTOMATED COUNT: 12.4 % (ref 11.6–15.1)
GFR SERPL CREATININE-BSD FRML MDRD: 110 ML/MIN/1.73SQ M
GLUCOSE SERPL-MCNC: 110 MG/DL (ref 65–140)
HCT VFR BLD AUTO: 37.8 % (ref 34.8–46.1)
HGB BLD-MCNC: 12.2 G/DL (ref 11.5–15.4)
IMM GRANULOCYTES # BLD AUTO: 0.02 THOUSAND/UL (ref 0–0.2)
IMM GRANULOCYTES NFR BLD AUTO: 0 % (ref 0–2)
LYMPHOCYTES # BLD AUTO: 2.23 THOUSANDS/ΜL (ref 0.6–4.47)
LYMPHOCYTES NFR BLD AUTO: 38 % (ref 14–44)
MCH RBC QN AUTO: 31 PG (ref 26.8–34.3)
MCHC RBC AUTO-ENTMCNC: 32.3 G/DL (ref 31.4–37.4)
MCV RBC AUTO: 96 FL (ref 82–98)
MONOCYTES # BLD AUTO: 0.43 THOUSAND/ΜL (ref 0.17–1.22)
MONOCYTES NFR BLD AUTO: 7 % (ref 4–12)
NEUTROPHILS # BLD AUTO: 2.94 THOUSANDS/ΜL (ref 1.85–7.62)
NEUTS SEG NFR BLD AUTO: 51 % (ref 43–75)
NRBC BLD AUTO-RTO: 0 /100 WBCS
PLATELET # BLD AUTO: 214 THOUSANDS/UL (ref 149–390)
PMV BLD AUTO: 10.9 FL (ref 8.9–12.7)
POTASSIUM SERPL-SCNC: 3.4 MMOL/L (ref 3.5–5.3)
PROT SERPL-MCNC: 7.6 G/DL (ref 6.4–8.2)
RBC # BLD AUTO: 3.93 MILLION/UL (ref 3.81–5.12)
SODIUM SERPL-SCNC: 139 MMOL/L (ref 136–145)
TSH SERPL DL<=0.05 MIU/L-ACNC: 0.75 UIU/ML (ref 0.36–3.74)
WBC # BLD AUTO: 5.8 THOUSAND/UL (ref 4.31–10.16)

## 2021-01-25 PROCEDURE — 85025 COMPLETE CBC W/AUTO DIFF WBC: CPT

## 2021-01-25 PROCEDURE — 80053 COMPREHEN METABOLIC PANEL: CPT

## 2021-01-25 PROCEDURE — 36415 COLL VENOUS BLD VENIPUNCTURE: CPT

## 2021-01-25 PROCEDURE — 91301 SARS-COV-2 / COVID-19 MRNA VACCINE (MODERNA) 100 MCG: CPT

## 2021-01-25 PROCEDURE — 84443 ASSAY THYROID STIM HORMONE: CPT

## 2021-01-25 PROCEDURE — 0012A SARS-COV-2 / COVID-19 MRNA VACCINE (MODERNA) 100 MCG: CPT

## 2021-01-27 ENCOUNTER — TELEPHONE (OUTPATIENT)
Dept: FAMILY MEDICINE CLINIC | Facility: CLINIC | Age: 31
End: 2021-01-27

## 2021-01-27 NOTE — TELEPHONE ENCOUNTER
All of these can be side effects of the vaccine  Encouraged Tylenol Motrin as needed for fevers chills or body aches    Encouraged ample p o  liquids with electrolytes and rest and if not improving she should call back if symptoms significantly worsen she can go to the ED as well

## 2021-02-13 DIAGNOSIS — I10 ESSENTIAL HYPERTENSION: ICD-10-CM

## 2021-02-13 DIAGNOSIS — F41.9 ANXIETY: ICD-10-CM

## 2021-02-14 DIAGNOSIS — U07.1 COVID-19: Primary | ICD-10-CM

## 2021-02-16 ENCOUNTER — APPOINTMENT (OUTPATIENT)
Dept: LAB | Facility: HOSPITAL | Age: 31
End: 2021-02-16

## 2021-02-16 DIAGNOSIS — F41.9 ANXIETY: ICD-10-CM

## 2021-02-16 DIAGNOSIS — I10 ESSENTIAL HYPERTENSION: ICD-10-CM

## 2021-02-16 DIAGNOSIS — U07.1 COVID-19: ICD-10-CM

## 2021-02-16 LAB — SARS-COV-2 RNA RESP QL NAA+PROBE: NEGATIVE

## 2021-02-16 PROCEDURE — 87635 SARS-COV-2 COVID-19 AMP PRB: CPT

## 2021-02-16 RX ORDER — CITALOPRAM 40 MG/1
40 TABLET ORAL DAILY
Qty: 90 TABLET | Refills: 0 | Status: SHIPPED | OUTPATIENT
Start: 2021-02-16 | End: 2021-05-19 | Stop reason: SDUPTHER

## 2021-02-16 RX ORDER — HYDROCHLOROTHIAZIDE 12.5 MG/1
12.5 TABLET ORAL DAILY
Qty: 30 TABLET | Refills: 0 | Status: SHIPPED | OUTPATIENT
Start: 2021-02-16 | End: 2021-02-16 | Stop reason: SDUPTHER

## 2021-02-16 RX ORDER — HYDROCHLOROTHIAZIDE 12.5 MG/1
12.5 TABLET ORAL DAILY
Qty: 30 TABLET | Refills: 0 | Status: SHIPPED | OUTPATIENT
Start: 2021-02-16 | End: 2021-09-01

## 2021-02-16 RX ORDER — CITALOPRAM 40 MG/1
40 TABLET ORAL DAILY
Qty: 90 TABLET | Refills: 0 | Status: SHIPPED | OUTPATIENT
Start: 2021-02-16 | End: 2021-02-16 | Stop reason: SDUPTHER

## 2021-05-19 DIAGNOSIS — F41.9 ANXIETY: ICD-10-CM

## 2021-05-21 RX ORDER — CITALOPRAM 40 MG/1
40 TABLET ORAL DAILY
Qty: 90 TABLET | Refills: 0 | Status: SHIPPED | OUTPATIENT
Start: 2021-05-21 | End: 2021-09-01 | Stop reason: SDUPTHER

## 2021-09-01 ENCOUNTER — OFFICE VISIT (OUTPATIENT)
Dept: FAMILY MEDICINE CLINIC | Facility: CLINIC | Age: 31
End: 2021-09-01
Payer: COMMERCIAL

## 2021-09-01 VITALS
WEIGHT: 121 LBS | TEMPERATURE: 97.1 F | BODY MASS INDEX: 22.26 KG/M2 | DIASTOLIC BLOOD PRESSURE: 80 MMHG | OXYGEN SATURATION: 99 % | HEART RATE: 77 BPM | SYSTOLIC BLOOD PRESSURE: 120 MMHG | HEIGHT: 62 IN

## 2021-09-01 DIAGNOSIS — Z00.00 ROUTINE ADULT HEALTH MAINTENANCE: Primary | ICD-10-CM

## 2021-09-01 DIAGNOSIS — F41.9 ANXIETY: ICD-10-CM

## 2021-09-01 PROCEDURE — 99395 PREV VISIT EST AGE 18-39: CPT | Performed by: FAMILY MEDICINE

## 2021-09-01 PROCEDURE — 99213 OFFICE O/P EST LOW 20 MIN: CPT | Performed by: FAMILY MEDICINE

## 2021-09-01 RX ORDER — BUSPIRONE HYDROCHLORIDE 5 MG/1
5 TABLET ORAL 2 TIMES DAILY
Qty: 60 TABLET | Refills: 0 | Status: SHIPPED | OUTPATIENT
Start: 2021-09-01 | End: 2021-11-03

## 2021-09-01 RX ORDER — CITALOPRAM 40 MG/1
40 TABLET ORAL DAILY
Qty: 90 TABLET | Refills: 1 | Status: SHIPPED | OUTPATIENT
Start: 2021-09-01 | End: 2021-12-03 | Stop reason: SDUPTHER

## 2021-09-01 NOTE — PROGRESS NOTES
Assessment/Plan:    41-year-old woman with: Annual well visit discussed various safety and health maintenance issues including healthy diet like the Mediterranean diet exercise healthy weight as tolerated ample sleep stress reduction strategies up-to-date with gyn    No problem-specific Assessment & Plan notes found for this encounter  Diagnoses and all orders for this visit:    Routine adult health maintenance  -     Lipid Panel with Direct LDL reflex; Future  -     HEMOGLOBIN A1C W/ EAG ESTIMATION; Future    Anxiety  -     citalopram (CeleXA) 40 mg tablet; Take 1 tablet (40 mg total) by mouth daily  -     busPIRone (BUSPAR) 5 mg tablet; Take 1 tablet (5 mg total) by mouth 2 (two) times a day          Subjective:     Chief Complaint   Patient presents with    Annual Exam    Medication Refill        Patient ID: Manuel Nolasco is a 27 y o  female  Patient is a 41-year-old woman who presents for an annual well visit she admits being physically active generally eats healthy diet she sleeps well no other health maintenance issues      The following portions of the patient's history were reviewed and updated as appropriate: allergies, current medications, past family history, past medical history, past social history, past surgical history and problem list     Review of Systems   Constitutional: Negative  HENT: Negative  Eyes: Negative  Respiratory: Negative  Cardiovascular: Negative  Gastrointestinal: Negative  Endocrine: Negative  Genitourinary: Negative  Musculoskeletal: Negative  Allergic/Immunologic: Negative  Neurological: Negative  Hematological: Negative  Psychiatric/Behavioral: Negative  All other systems reviewed and are negative          Objective:      /80 (BP Location: Left arm, Patient Position: Sitting, Cuff Size: Standard)   Pulse 77   Temp (!) 97 1 °F (36 2 °C)   Ht 5' 2" (1 575 m)   Wt 54 9 kg (121 lb)   SpO2 99%   BMI 22 13 kg/m² Physical Exam  Constitutional:       Appearance: She is well-developed  HENT:      Head: Atraumatic  Right Ear: External ear normal       Left Ear: External ear normal    Eyes:      Conjunctiva/sclera: Conjunctivae normal       Pupils: Pupils are equal, round, and reactive to light  Cardiovascular:      Rate and Rhythm: Normal rate and regular rhythm  Heart sounds: Normal heart sounds  Pulmonary:      Effort: Pulmonary effort is normal  No respiratory distress  Breath sounds: Normal breath sounds  Abdominal:      General: Bowel sounds are normal  There is no distension  Palpations: Abdomen is soft  Tenderness: There is no abdominal tenderness  There is no guarding or rebound  Musculoskeletal:         General: Normal range of motion  Cervical back: Normal range of motion  Skin:     General: Skin is warm and dry  Neurological:      Mental Status: She is alert and oriented to person, place, and time  Cranial Nerves: No cranial nerve deficit  Psychiatric:         Behavior: Behavior normal          Thought Content:  Thought content normal          Judgment: Judgment normal

## 2021-09-01 NOTE — PROGRESS NOTES
Assessment/Plan:    79-year-old woman with: Anxiety discussed treatment options with risks and benefits will add BuSpar patient update us on her progress in 1 month otherwise follow-up in 6 months    No problem-specific Assessment & Plan notes found for this encounter  Diagnoses and all orders for this visit:    Routine adult health maintenance  -     Lipid Panel with Direct LDL reflex; Future  -     HEMOGLOBIN A1C W/ EAG ESTIMATION; Future    Anxiety  -     citalopram (CeleXA) 40 mg tablet; Take 1 tablet (40 mg total) by mouth daily  -     busPIRone (BUSPAR) 5 mg tablet; Take 1 tablet (5 mg total) by mouth 2 (two) times a day          Subjective:     Chief Complaint   Patient presents with    Annual Exam    Medication Refill        Patient ID: Vik Chapman is a 27 y o  female  Patient is a 79-year-old woman who presents for follow-up on anxiety she does admit some breakthrough anxiety she would like to see if there is something else she can do no fevers chills nausea vomiting tolerating p o  intake no other complaints at this      The following portions of the patient's history were reviewed and updated as appropriate: allergies, current medications, past family history, past medical history, past social history, past surgical history and problem list     Review of Systems   Constitutional: Negative  HENT: Negative  Eyes: Negative  Respiratory: Negative  Cardiovascular: Negative  Gastrointestinal: Negative  Endocrine: Negative  Genitourinary: Negative  Musculoskeletal: Negative  Allergic/Immunologic: Negative  Neurological: Negative  Hematological: Negative  Psychiatric/Behavioral: The patient is nervous/anxious  All other systems reviewed and are negative          Objective:      /80 (BP Location: Left arm, Patient Position: Sitting, Cuff Size: Standard)   Pulse 77   Temp (!) 97 1 °F (36 2 °C)   Ht 5' 2" (1 575 m)   Wt 54 9 kg (121 lb)   SpO2 99% BMI 22 13 kg/m²          Physical Exam  Constitutional:       Appearance: She is well-developed  HENT:      Head: Atraumatic  Right Ear: External ear normal       Left Ear: External ear normal    Eyes:      Conjunctiva/sclera: Conjunctivae normal       Pupils: Pupils are equal, round, and reactive to light  Cardiovascular:      Rate and Rhythm: Normal rate and regular rhythm  Heart sounds: Normal heart sounds  Pulmonary:      Effort: Pulmonary effort is normal  No respiratory distress  Breath sounds: Normal breath sounds  Abdominal:      General: Bowel sounds are normal  There is no distension  Palpations: Abdomen is soft  Tenderness: There is no abdominal tenderness  There is no guarding or rebound  Musculoskeletal:         General: Normal range of motion  Cervical back: Normal range of motion  Skin:     General: Skin is warm and dry  Neurological:      Mental Status: She is alert and oriented to person, place, and time  Cranial Nerves: No cranial nerve deficit  Psychiatric:         Behavior: Behavior normal          Thought Content:  Thought content normal          Judgment: Judgment normal

## 2021-11-03 ENCOUNTER — OFFICE VISIT (OUTPATIENT)
Dept: FAMILY MEDICINE CLINIC | Facility: CLINIC | Age: 31
End: 2021-11-03
Payer: COMMERCIAL

## 2021-11-03 VITALS
SYSTOLIC BLOOD PRESSURE: 170 MMHG | BODY MASS INDEX: 22.26 KG/M2 | HEART RATE: 73 BPM | HEIGHT: 62 IN | OXYGEN SATURATION: 98 % | WEIGHT: 121 LBS | DIASTOLIC BLOOD PRESSURE: 98 MMHG | TEMPERATURE: 97.5 F

## 2021-11-03 DIAGNOSIS — S16.1XXA STRAIN OF NECK MUSCLE, INITIAL ENCOUNTER: ICD-10-CM

## 2021-11-03 DIAGNOSIS — R11.0 NAUSEA: Primary | ICD-10-CM

## 2021-11-03 DIAGNOSIS — R19.7 DIARRHEA, UNSPECIFIED TYPE: ICD-10-CM

## 2021-11-03 PROCEDURE — U0003 INFECTIOUS AGENT DETECTION BY NUCLEIC ACID (DNA OR RNA); SEVERE ACUTE RESPIRATORY SYNDROME CORONAVIRUS 2 (SARS-COV-2) (CORONAVIRUS DISEASE [COVID-19]), AMPLIFIED PROBE TECHNIQUE, MAKING USE OF HIGH THROUGHPUT TECHNOLOGIES AS DESCRIBED BY CMS-2020-01-R: HCPCS | Performed by: FAMILY MEDICINE

## 2021-11-03 PROCEDURE — 99213 OFFICE O/P EST LOW 20 MIN: CPT | Performed by: FAMILY MEDICINE

## 2021-11-03 PROCEDURE — U0005 INFEC AGEN DETEC AMPLI PROBE: HCPCS | Performed by: FAMILY MEDICINE

## 2021-11-03 RX ORDER — ONDANSETRON 4 MG/1
4 TABLET, FILM COATED ORAL EVERY 8 HOURS PRN
Qty: 20 TABLET | Refills: 0 | Status: SHIPPED | OUTPATIENT
Start: 2021-11-03 | End: 2022-02-17

## 2021-11-03 RX ORDER — METHOCARBAMOL 500 MG/1
500 TABLET, FILM COATED ORAL 4 TIMES DAILY
Qty: 60 TABLET | Refills: 0 | Status: SHIPPED | OUTPATIENT
Start: 2021-11-03 | End: 2022-02-17

## 2021-11-04 LAB — SARS-COV-2 RNA RESP QL NAA+PROBE: NEGATIVE

## 2021-12-07 ENCOUNTER — OFFICE VISIT (OUTPATIENT)
Dept: FAMILY MEDICINE CLINIC | Facility: CLINIC | Age: 31
End: 2021-12-07
Payer: COMMERCIAL

## 2021-12-07 ENCOUNTER — APPOINTMENT (OUTPATIENT)
Dept: LAB | Facility: HOSPITAL | Age: 31
End: 2021-12-07
Payer: COMMERCIAL

## 2021-12-07 VITALS
WEIGHT: 120.6 LBS | RESPIRATION RATE: 16 BRPM | SYSTOLIC BLOOD PRESSURE: 124 MMHG | HEIGHT: 62 IN | BODY MASS INDEX: 22.19 KG/M2 | HEART RATE: 85 BPM | OXYGEN SATURATION: 98 % | TEMPERATURE: 98.6 F | DIASTOLIC BLOOD PRESSURE: 82 MMHG

## 2021-12-07 DIAGNOSIS — R11.0 NAUSEA: ICD-10-CM

## 2021-12-07 DIAGNOSIS — K62.5 BRBPR (BRIGHT RED BLOOD PER RECTUM): Primary | ICD-10-CM

## 2021-12-07 DIAGNOSIS — K62.5 BRBPR (BRIGHT RED BLOOD PER RECTUM): ICD-10-CM

## 2021-12-07 DIAGNOSIS — F41.9 ANXIETY: ICD-10-CM

## 2021-12-07 DIAGNOSIS — Z00.00 ROUTINE ADULT HEALTH MAINTENANCE: ICD-10-CM

## 2021-12-07 LAB
ALBUMIN SERPL BCP-MCNC: 3.9 G/DL (ref 3.5–5)
ALP SERPL-CCNC: 26 U/L (ref 46–116)
ALT SERPL W P-5'-P-CCNC: 27 U/L (ref 12–78)
ANION GAP SERPL CALCULATED.3IONS-SCNC: 7 MMOL/L (ref 4–13)
AST SERPL W P-5'-P-CCNC: 21 U/L (ref 5–45)
BILIRUB SERPL-MCNC: 0.34 MG/DL (ref 0.2–1)
BUN SERPL-MCNC: 12 MG/DL (ref 5–25)
CALCIUM SERPL-MCNC: 8.8 MG/DL (ref 8.3–10.1)
CHLORIDE SERPL-SCNC: 105 MMOL/L (ref 100–108)
CHOLEST SERPL-MCNC: 166 MG/DL
CO2 SERPL-SCNC: 26 MMOL/L (ref 21–32)
CREAT SERPL-MCNC: 0.71 MG/DL (ref 0.6–1.3)
CRP SERPL QL: <3 MG/L
GFR SERPL CREATININE-BSD FRML MDRD: 113 ML/MIN/1.73SQ M
GLUCOSE P FAST SERPL-MCNC: 72 MG/DL (ref 65–99)
HDLC SERPL-MCNC: 76 MG/DL
LDLC SERPL CALC-MCNC: 84 MG/DL (ref 0–100)
POTASSIUM SERPL-SCNC: 4 MMOL/L (ref 3.5–5.3)
PROT SERPL-MCNC: 7.2 G/DL (ref 6.4–8.2)
SODIUM SERPL-SCNC: 138 MMOL/L (ref 136–145)
TRIGL SERPL-MCNC: 28 MG/DL
TSH SERPL DL<=0.05 MIU/L-ACNC: 0.72 UIU/ML (ref 0.36–3.74)

## 2021-12-07 PROCEDURE — 36415 COLL VENOUS BLD VENIPUNCTURE: CPT

## 2021-12-07 PROCEDURE — 83516 IMMUNOASSAY NONANTIBODY: CPT

## 2021-12-07 PROCEDURE — 86038 ANTINUCLEAR ANTIBODIES: CPT

## 2021-12-07 PROCEDURE — 80061 LIPID PANEL: CPT

## 2021-12-07 PROCEDURE — 83036 HEMOGLOBIN GLYCOSYLATED A1C: CPT

## 2021-12-07 PROCEDURE — 99214 OFFICE O/P EST MOD 30 MIN: CPT | Performed by: FAMILY MEDICINE

## 2021-12-07 PROCEDURE — 86255 FLUORESCENT ANTIBODY SCREEN: CPT

## 2021-12-07 PROCEDURE — 81382 HLA II TYPING 1 LOC HR: CPT

## 2021-12-07 PROCEDURE — 81376 HLA II TYPING 1 LOCUS LR: CPT

## 2021-12-07 PROCEDURE — 80053 COMPREHEN METABOLIC PANEL: CPT

## 2021-12-07 PROCEDURE — 86140 C-REACTIVE PROTEIN: CPT

## 2021-12-07 PROCEDURE — 86671 FUNGUS NES ANTIBODY: CPT

## 2021-12-07 PROCEDURE — 85025 COMPLETE CBC W/AUTO DIFF WBC: CPT

## 2021-12-07 PROCEDURE — 84443 ASSAY THYROID STIM HORMONE: CPT

## 2021-12-07 RX ORDER — HYDROXYZINE HYDROCHLORIDE 10 MG/1
10 TABLET, FILM COATED ORAL 2 TIMES DAILY PRN
Qty: 30 TABLET | Refills: 0 | Status: SHIPPED | OUTPATIENT
Start: 2021-12-07 | End: 2022-02-17

## 2021-12-07 RX ORDER — BUSPIRONE HYDROCHLORIDE 5 MG/1
5 TABLET ORAL 2 TIMES DAILY
Qty: 60 TABLET | Refills: 0 | Status: SHIPPED | OUTPATIENT
Start: 2021-12-07 | End: 2022-02-17

## 2021-12-08 LAB
BASOPHILS # BLD AUTO: 0.04 THOUSANDS/ΜL (ref 0–0.1)
BASOPHILS NFR BLD AUTO: 1 % (ref 0–1)
EOSINOPHIL # BLD AUTO: 0.06 THOUSAND/ΜL (ref 0–0.61)
EOSINOPHIL NFR BLD AUTO: 1 % (ref 0–6)
ERYTHROCYTE [DISTWIDTH] IN BLOOD BY AUTOMATED COUNT: 13.6 % (ref 11.6–15.1)
EST. AVERAGE GLUCOSE BLD GHB EST-MCNC: 105 MG/DL
HBA1C MFR BLD: 5.3 %
HCT VFR BLD AUTO: 39.6 % (ref 34.8–46.1)
HGB BLD-MCNC: 12 G/DL (ref 11.5–15.4)
IMM GRANULOCYTES # BLD AUTO: 0.02 THOUSAND/UL (ref 0–0.2)
IMM GRANULOCYTES NFR BLD AUTO: 0 % (ref 0–2)
LYMPHOCYTES # BLD AUTO: 1.88 THOUSANDS/ΜL (ref 0.6–4.47)
LYMPHOCYTES NFR BLD AUTO: 26 % (ref 14–44)
MCH RBC QN AUTO: 31.7 PG (ref 26.8–34.3)
MCHC RBC AUTO-ENTMCNC: 30.3 G/DL (ref 31.4–37.4)
MCV RBC AUTO: 105 FL (ref 82–98)
MONOCYTES # BLD AUTO: 0.5 THOUSAND/ΜL (ref 0.17–1.22)
MONOCYTES NFR BLD AUTO: 7 % (ref 4–12)
NEUTROPHILS # BLD AUTO: 4.82 THOUSANDS/ΜL (ref 1.85–7.62)
NEUTS SEG NFR BLD AUTO: 65 % (ref 43–75)
NRBC BLD AUTO-RTO: 0 /100 WBCS
PLATELET # BLD AUTO: 215 THOUSANDS/UL (ref 149–390)
PMV BLD AUTO: 12.6 FL (ref 8.9–12.7)
RBC # BLD AUTO: 3.79 MILLION/UL (ref 3.81–5.12)
RYE IGE QN: NEGATIVE
WBC # BLD AUTO: 7.32 THOUSAND/UL (ref 4.31–10.16)

## 2021-12-10 LAB
BAKER'S YEAST IGG QN IA: 46 UNITS (ref 0–50)
CHITOBIOSIDE IGA SERPL IA-ACNC: 18 UNITS (ref 0–90)
IBD COMMENTS: NORMAL
LAMINARIBIOSIDE IGG SERPL IA-ACNC: 4 UNITS (ref 0–60)
MANNOBIOSIDE IGG SERPL IA-ACNC: 59 UNITS (ref 0–100)
P-ANCA ATYPICAL SER QL IF: NEGATIVE

## 2021-12-14 LAB
ANNOTATION COMMENT IMP: NORMAL
HLA-DQ2 QL: POSITIVE
HLA-DQ8 QL: NEGATIVE
REF LAB TEST METHOD: NORMAL

## 2021-12-27 ENCOUNTER — TELEPHONE (OUTPATIENT)
Dept: FAMILY MEDICINE CLINIC | Facility: CLINIC | Age: 31
End: 2021-12-27

## 2021-12-27 DIAGNOSIS — R05.9 COUGH: Primary | ICD-10-CM

## 2021-12-28 PROCEDURE — U0003 INFECTIOUS AGENT DETECTION BY NUCLEIC ACID (DNA OR RNA); SEVERE ACUTE RESPIRATORY SYNDROME CORONAVIRUS 2 (SARS-COV-2) (CORONAVIRUS DISEASE [COVID-19]), AMPLIFIED PROBE TECHNIQUE, MAKING USE OF HIGH THROUGHPUT TECHNOLOGIES AS DESCRIBED BY CMS-2020-01-R: HCPCS | Performed by: FAMILY MEDICINE

## 2021-12-28 PROCEDURE — U0005 INFEC AGEN DETEC AMPLI PROBE: HCPCS | Performed by: FAMILY MEDICINE

## 2022-02-14 NOTE — PROGRESS NOTES
Assessment/Plan:      Diagnoses and all orders for this visit:    High risk heterosexual behavior  -     Chlamydia/GC amplified DNA by PCR  -     Hepatitis B surface antigen; Future  -     Hepatitis C antibody; Future  -     HIV 1/2 Antigen/Antibody (4th Generation) w Reflex [facility]; Future  -     RPR; Future      -reviewed safe sexual practices including consistent condom use  -reviewed options for contraception including LARCs  Patient is not interested in contraception   - will call with results  Signs and symptoms report reviewed    RTO annual exam     Subjective:     Patient ID: Rochelle Duke is a 27 y o  female  HPI  here with request for STI testing  LMP 22 Denies recent exposure or symptoms  Admits to new partner  Is sexually active using withdrawal for contraception  Last Pap smear 12/3/19- NILM  Gardasil had series    Review of Systems   Constitutional: Negative for chills, fatigue and fever  Respiratory: Negative  Cardiovascular: Negative  Genitourinary: Negative  Objective:  /80   Ht 5' 2" (1 575 m)   Wt 51 3 kg (113 lb)   LMP 2022 (Approximate)   BMI 20 67 kg/m²      Physical Exam  Constitutional:       Appearance: Normal appearance  Cardiovascular:      Rate and Rhythm: Normal rate and regular rhythm  Heart sounds: Normal heart sounds  Pulmonary:      Effort: Pulmonary effort is normal       Breath sounds: Normal breath sounds  Neurological:      Mental Status: She is alert and oriented to person, place, and time     Psychiatric:         Mood and Affect: Mood normal

## 2022-02-14 NOTE — PATIENT INSTRUCTIONS
Thank you for visiting [facility]  You may be invited to complete a survey about you visit  Your responses will help us improve care we provide  A 10 means the care you received at your visit met your expectations  If you are unable to give a 10 please list reasons so we can work on improving your patient experience  Safe Sex Practices   WHAT YOU NEED TO KNOW:   What are safe sex practices? Safe sex practices are ways to prevent pregnancy and the spread of sexually transmitted infections (STIs)  An STI happens when a virus or bacteria are spread through sexual activity  Safe sex practices help decrease or prevent body fluid exchange during sex  Body fluids include saliva, urine, blood, vaginal fluids, and semen  Oral, vaginal, and anal sex can all spread STIs  What are some safe sex practices to follow before I have sex? · Talk to a new partner before you have sex  Tell your partner if you have an STI  Ask about his or her sex history and if he or she has a current or past STI  Your partner may need to be tested and treated  Do not have sex while you are being treated for an STI, or with a partner who is being treated  · Limit your number of sex partners  More than one sex partner can increase your risk for an STI  Do not have sex with anyone whose sex history you do not know  · Get tested for STIs if needed  Get tested if you had sex with someone who has an STI  Get tested if you have unprotected sex with any new partner  · Talk to your healthcare provider about birth control  Birth control can help prevent an unwanted pregnancy  There are many different types of birth control  Talk to your healthcare provider about which birth control method is right for you  · Ask about medicines to lower your risk for some STIs:      ? Vaccines  can help protect you from hepatitis A, hepatitis B, and the human papillomavirus (HPV)   The HPV vaccine is usually given at 11 years, but it may be given through 26 years to both females and males  Your provider can give you more information on vaccines to prevent STIs  ? Pre-exposure prophylaxis (PrEP)  may be given if you are at high risk for HIV  PrEP is taken every day to prevent the virus from fully infecting the body  · Do not use alcohol or drugs before sex  These can prevent you from thinking clearly and increase your risk for unsafe sex  What are some safe sex practices to follow while I am having sex? · Use condoms and barrier methods for all types of sexual contact  This includes oral, vaginal, and anal sex  Male and female condoms are available  Make sure that the condom fits and is put on correctly  Rubber latex sheets or dental dams can be used for oral sex  Use a new condom or latex barrier each time you have sex  Use latex condoms, if possible  Lambskin or natural condoms do not prevent STIs  If you or your partner is allergic to latex, use a nonlatex product, such as polyurethane  Use a second form of birth control with the condom to prevent pregnancy and STIs  Do not use male and female condoms together  · Only use water-based lubricants during sex  Water-based lubricants help prevent sores or cuts in the vagina or on the penis  Prevent sores or cuts to decrease your risk for an STI  Do not use oil-based lubricants, such as baby oil or hand lotion, with latex condoms or barriers  These will weaken the latex and may cause the condom to break  · Do not use chemicals that irritate your skin  Products that contain chemical irritants, such as spermicides, can irritate the lining of your vagina or rectum  Irritation may cause sores that can increase your risk for an STI  · Be careful when you have sex if you have open sores or cuts  Open sores or cuts may increase your risk for an STI  Keep all open sores or cuts covered during sex  Do not have oral sex if you have cuts or sores in your mouth      · Do not do activities that can pass germs  Do not use saliva as a lubricant or share sex toys  Where can I find more information? · [id number] Meera (MAKAYLA)  P O  [address] [facility] , [address] [place] [address]  Web Address: [email or web address]  org    When should I seek immediate care? · A condom breaks, leaks, or slips off while you are having sex  · You notice sores on your penis, vagina, anal area, or the skin around them  · You have had unsafe sex and want to discuss emergency contraception or treatment for STI exposure  When should I call my doctor? · You think you or your female sex partner might be pregnant  · You have questions or concerns about your condition or care  CARE AGREEMENT:   You have the right to help plan your care  Learn about your health condition and how it may be treated  Discuss treatment options with your healthcare providers to decide what care you want to receive  You always have the right to refuse treatment  The above information is an  only  It is not intended as medical advice for individual conditions or treatments  Talk to your doctor, nurse or pharmacist before following any medical regimen to see if it is safe and effective for you  © Copyright [employer] 2021 Information is for End User's use only and may not be sold, redistributed or otherwise used for commercial purposes  All illustrations and images included in CareNotes® are the copyrighted property of [employer]  or [address] [place] [address],[address] [facility]   WHAT YOU NEED TO KNOW:   What are safe sex practices? Safe sex practices are ways to prevent pregnancy and the spread of sexually transmitted infections (STIs)  An STI happens when a virus or bacteria are spread through sexual activity  Safe sex practices help decrease or prevent body fluid exchange during sex  Body fluids include saliva, urine, blood, vaginal fluids, and semen   Oral, vaginal, and anal sex can all spread STIs   What are some safe sex practices to follow before I have sex? · Talk to a new partner before you have sex  Tell your partner if you have an STI  Ask about his or her sex history and if he or she has a current or past STI  Your partner may need to be tested and treated  Do not have sex while you are being treated for an STI, or with a partner who is being treated  · Limit your number of sex partners  More than one sex partner can increase your risk for an STI  Do not have sex with anyone whose sex history you do not know  · Get tested for STIs if needed  Get tested if you had sex with someone who has an STI  Get tested if you have unprotected sex with any new partner  · Talk to your healthcare provider about birth control  Birth control can help prevent an unwanted pregnancy  There are many different types of birth control  Talk to your healthcare provider about which birth control method is right for you  · Ask about medicines to lower your risk for some STIs:      ? Vaccines  can help protect you from hepatitis A, hepatitis B, and the human papillomavirus (HPV)  The HPV vaccine is usually given at 11 years, but it may be given through 26 years to both females and males  Your provider can give you more information on vaccines to prevent STIs  ? Pre-exposure prophylaxis (PrEP)  may be given if you are at high risk for HIV  PrEP is taken every day to prevent the virus from fully infecting the body  · Do not use alcohol or drugs before sex  These can prevent you from thinking clearly and increase your risk for unsafe sex  What are some safe sex practices to follow while I am having sex? · Use condoms and barrier methods for all types of sexual contact  This includes oral, vaginal, and anal sex  Male and female condoms are available  Make sure that the condom fits and is put on correctly  Rubber latex sheets or dental dams can be used for oral sex   Use a new condom or latex barrier each time you have sex  Use latex condoms, if possible  Lambskin or natural condoms do not prevent STIs  If you or your partner is allergic to latex, use a nonlatex product, such as polyurethane  Use a second form of birth control with the condom to prevent pregnancy and STIs  Do not use male and female condoms together  · Only use water-based lubricants during sex  Water-based lubricants help prevent sores or cuts in the vagina or on the penis  Prevent sores or cuts to decrease your risk for an STI  Do not use oil-based lubricants, such as baby oil or hand lotion, with latex condoms or barriers  These will weaken the latex and may cause the condom to break  · Do not use chemicals that irritate your skin  Products that contain chemical irritants, such as spermicides, can irritate the lining of your vagina or rectum  Irritation may cause sores that can increase your risk for an STI  · Be careful when you have sex if you have open sores or cuts  Open sores or cuts may increase your risk for an STI  Keep all open sores or cuts covered during sex  Do not have oral sex if you have cuts or sores in your mouth  · Do not do activities that can pass germs  Do not use saliva as a lubricant or share sex toys  Where can I find more information? · [id number] Estes (MAKAYLA)  P O  [address] [facility] , [address] [place] [address]  Web Address: [email or web address]  org    When should I seek immediate care? · A condom breaks, leaks, or slips off while you are having sex  · You notice sores on your penis, vagina, anal area, or the skin around them  · You have had unsafe sex and want to discuss emergency contraception or treatment for STI exposure  When should I call my doctor? · You think you or your female sex partner might be pregnant  · You have questions or concerns about your condition or care  CARE AGREEMENT:   You have the right to help plan your care   Learn about your health condition and how it may be treated  Discuss treatment options with your healthcare providers to decide what care you want to receive  You always have the right to refuse treatment  The above information is an  only  It is not intended as medical advice for individual conditions or treatments  Talk to your doctor, nurse or pharmacist before following any medical regimen to see if it is safe and effective for you  © Copyright [employer] 2021 Information is for End User's use only and may not be sold, redistributed or otherwise used for commercial purposes   All illustrations and images included in CareNotes® are the copyrighted property of [employer] , Inc  or [address] [place] [address]

## 2022-02-17 ENCOUNTER — APPOINTMENT (OUTPATIENT)
Dept: LAB | Facility: MEDICAL CENTER | Age: 32
End: 2022-02-17
Payer: COMMERCIAL

## 2022-02-17 ENCOUNTER — OFFICE VISIT (OUTPATIENT)
Dept: OBGYN CLINIC | Facility: MEDICAL CENTER | Age: 32
End: 2022-02-17
Payer: COMMERCIAL

## 2022-02-17 VITALS
HEIGHT: 62 IN | BODY MASS INDEX: 20.8 KG/M2 | WEIGHT: 113 LBS | SYSTOLIC BLOOD PRESSURE: 124 MMHG | DIASTOLIC BLOOD PRESSURE: 80 MMHG

## 2022-02-17 DIAGNOSIS — Z72.51 HIGH RISK HETEROSEXUAL BEHAVIOR: Primary | ICD-10-CM

## 2022-02-17 DIAGNOSIS — Z72.51 HIGH RISK HETEROSEXUAL BEHAVIOR: ICD-10-CM

## 2022-02-17 PROBLEM — J01.10 ACUTE NON-RECURRENT FRONTAL SINUSITIS: Status: RESOLVED | Noted: 2019-04-15 | Resolved: 2022-02-17

## 2022-02-17 PROBLEM — O98.313 GENITAL HERPES AFFECTING PREGNANCY IN THIRD TRIMESTER: Status: RESOLVED | Noted: 2017-04-18 | Resolved: 2022-02-17

## 2022-02-17 PROBLEM — O99.343 ANXIETY DURING PREGNANCY IN THIRD TRIMESTER, ANTEPARTUM: Status: RESOLVED | Noted: 2017-04-18 | Resolved: 2022-02-17

## 2022-02-17 PROBLEM — F41.9 ANXIETY DURING PREGNANCY IN THIRD TRIMESTER, ANTEPARTUM: Status: RESOLVED | Noted: 2017-04-18 | Resolved: 2022-02-17

## 2022-02-17 PROBLEM — Z3A.40 40 WEEKS GESTATION OF PREGNANCY: Status: RESOLVED | Noted: 2017-04-19 | Resolved: 2022-02-17

## 2022-02-17 PROBLEM — A60.09 GENITAL HERPES AFFECTING PREGNANCY IN THIRD TRIMESTER: Status: RESOLVED | Noted: 2017-04-18 | Resolved: 2022-02-17

## 2022-02-17 PROBLEM — Z00.00 ROUTINE ADULT HEALTH MAINTENANCE: Status: RESOLVED | Noted: 2021-09-01 | Resolved: 2022-02-17

## 2022-02-17 PROBLEM — R11.0 NAUSEA: Status: RESOLVED | Noted: 2021-11-03 | Resolved: 2022-02-17

## 2022-02-17 PROBLEM — R39.9 UTI SYMPTOMS: Status: RESOLVED | Noted: 2018-11-02 | Resolved: 2022-02-17

## 2022-02-17 LAB
HBV SURFACE AG SER QL: NORMAL
HCV AB SER QL: NORMAL
RPR SER QL: NORMAL

## 2022-02-17 PROCEDURE — 36415 COLL VENOUS BLD VENIPUNCTURE: CPT

## 2022-02-17 PROCEDURE — 86592 SYPHILIS TEST NON-TREP QUAL: CPT

## 2022-02-17 PROCEDURE — 87340 HEPATITIS B SURFACE AG IA: CPT

## 2022-02-17 PROCEDURE — 87389 HIV-1 AG W/HIV-1&-2 AB AG IA: CPT

## 2022-02-17 PROCEDURE — 87591 N.GONORRHOEAE DNA AMP PROB: CPT | Performed by: NURSE PRACTITIONER

## 2022-02-17 PROCEDURE — 87491 CHLMYD TRACH DNA AMP PROBE: CPT | Performed by: NURSE PRACTITIONER

## 2022-02-17 PROCEDURE — 99213 OFFICE O/P EST LOW 20 MIN: CPT | Performed by: NURSE PRACTITIONER

## 2022-02-17 PROCEDURE — 86803 HEPATITIS C AB TEST: CPT

## 2022-02-18 LAB
C TRACH DNA SPEC QL NAA+PROBE: NEGATIVE
HIV 1+2 AB+HIV1 P24 AG SERPL QL IA: NORMAL
N GONORRHOEA DNA SPEC QL NAA+PROBE: NEGATIVE

## 2022-03-07 DIAGNOSIS — F41.9 ANXIETY: ICD-10-CM

## 2022-03-08 RX ORDER — CITALOPRAM 40 MG/1
40 TABLET ORAL DAILY
Qty: 90 TABLET | Refills: 0 | Status: SHIPPED | OUTPATIENT
Start: 2022-03-08 | End: 2022-07-08 | Stop reason: SDUPTHER

## 2022-04-12 ENCOUNTER — OFFICE VISIT (OUTPATIENT)
Dept: FAMILY MEDICINE CLINIC | Facility: CLINIC | Age: 32
End: 2022-04-12
Payer: COMMERCIAL

## 2022-04-12 ENCOUNTER — LAB REQUISITION (OUTPATIENT)
Dept: LAB | Facility: HOSPITAL | Age: 32
End: 2022-04-12
Payer: COMMERCIAL

## 2022-04-12 VITALS
TEMPERATURE: 99.4 F | SYSTOLIC BLOOD PRESSURE: 132 MMHG | HEART RATE: 89 BPM | WEIGHT: 114 LBS | OXYGEN SATURATION: 99 % | BODY MASS INDEX: 20.98 KG/M2 | DIASTOLIC BLOOD PRESSURE: 92 MMHG | HEIGHT: 62 IN

## 2022-04-12 DIAGNOSIS — J06.9 ACUTE UPPER RESPIRATORY INFECTION: Primary | ICD-10-CM

## 2022-04-12 DIAGNOSIS — J06.9 ACUTE UPPER RESPIRATORY INFECTION, UNSPECIFIED: ICD-10-CM

## 2022-04-12 PROCEDURE — U0003 INFECTIOUS AGENT DETECTION BY NUCLEIC ACID (DNA OR RNA); SEVERE ACUTE RESPIRATORY SYNDROME CORONAVIRUS 2 (SARS-COV-2) (CORONAVIRUS DISEASE [COVID-19]), AMPLIFIED PROBE TECHNIQUE, MAKING USE OF HIGH THROUGHPUT TECHNOLOGIES AS DESCRIBED BY CMS-2020-01-R: HCPCS | Performed by: FAMILY MEDICINE

## 2022-04-12 PROCEDURE — U0005 INFEC AGEN DETEC AMPLI PROBE: HCPCS | Performed by: FAMILY MEDICINE

## 2022-04-12 PROCEDURE — 87070 CULTURE OTHR SPECIMN AEROBIC: CPT | Performed by: FAMILY MEDICINE

## 2022-04-12 PROCEDURE — 87147 CULTURE TYPE IMMUNOLOGIC: CPT | Performed by: FAMILY MEDICINE

## 2022-04-12 PROCEDURE — 99213 OFFICE O/P EST LOW 20 MIN: CPT | Performed by: FAMILY MEDICINE

## 2022-04-12 RX ORDER — AMOXICILLIN 500 MG/1
500 TABLET, FILM COATED ORAL 3 TIMES DAILY
Qty: 21 TABLET | Refills: 0 | Status: SHIPPED | OUTPATIENT
Start: 2022-04-12 | End: 2022-04-19

## 2022-04-12 NOTE — PROGRESS NOTES
COVID-19 Outpatient Progress Note    Assessment/Plan:    Problem List Items Addressed This Visit        Respiratory    Acute upper respiratory infection - Primary    Relevant Medications    amoxicillin (AMOXIL) 500 MG tablet    Other Relevant Orders    COVID Only- Office Collect    Strep A PCR         Disposition:     PCR testing will be performed to test for COVID-19/Influenza  I have spent 10 minutes directly with the patient  Greater than 50% of this time was spent in counseling/coordination of care regarding: risks and benefits of treatment options, instructions for management and patient and family education  Encounter provider Andres Sargent MD    Provider located at [address] [place] [address]  [address] [facility] , [facility] [address]  [facility]  [phone]    Recent Visits  No visits were found meeting these conditions  Showing recent visits within past 7 days and meeting all other requirements  Today's Visits  Date Type Provider Dept   04/12/22 Office Visit Andres Sargent MD [address] today's visits and meeting all other requirements  Future Appointments  No visits were found meeting these conditions  Showing future appointments within next 150 days and meeting all other requirements     Subjective:   Jarrod Velasquez is a 34 y o  female who is concerned about COVID-19  Patient's symptoms include nasal congestion, sore throat and cough             COVID-19 vaccination status: Fully vaccinated (primary series)    Exposure:     Hospitalized recently for fever and/or lower respiratory symptoms?: No      Currently a healthcare worker that is involved in direct patient care?: Yes      Works in a special setting where the risk of COVID-19 transmission may be high? (this may include long-term care, correctional and [facility] facilities; homeless shelters; assisted-living facilities and group homes ): No      Resident in a special setting where the risk of COVID-19 transmission may be high? (this may include long-term care, correctional and [facility] facilities; homeless shelters; assisted-living facilities and group homes ): No      Lab Results   Component Value Date    SARSCOV2 Negative 12/28/2021    SARSCOV2 Not Detected 12/18/2020     Past Medical History:   Diagnosis Date    ADHD (attention deficit hyperactivity disorder)     Anxiety     Chickenpox     Depression     Eczema     Herpes genitalis     Varicella      No past surgical history on file  Current Outpatient Medications   Medication Sig Dispense Refill    citalopram (CeleXA) 40 mg tablet Take 1 tablet (40 mg total) by mouth daily 90 tablet 0    amoxicillin (AMOXIL) 500 MG tablet Take 1 tablet (500 mg total) by mouth 3 (three) times a day for 7 days 21 tablet 0     No current facility-administered medications for this visit  No Known Allergies    Review of Systems   Constitutional: Negative  HENT: Positive for congestion, sinus pressure and sore throat  Eyes: Negative  Respiratory: Positive for cough  Cardiovascular: Negative  Gastrointestinal: Negative  Endocrine: Negative  Genitourinary: Negative  Musculoskeletal: Negative  Allergic/Immunologic: Negative  Neurological: Negative  Hematological: Negative  Psychiatric/Behavioral: Negative  All other systems reviewed and are negative  Objective:    Vitals:    04/12/22 1032   BP: 132/92   BP Location: Left arm   Patient Position: Sitting   Cuff Size: Adult   Pulse: 89   Temp: 99 4 °F (37 4 °C)   SpO2: 99%   Weight: 51 7 kg (114 lb)   Height: 5' 2" (1 575 m)       Physical Exam  Constitutional:       General: She is not in acute distress  Appearance: She is well-developed  She is not diaphoretic  HENT:      Head: Normocephalic and atraumatic        Right Ear: External ear normal       Left Ear: External ear normal       Nose: Nose normal       Mouth/Throat: Pharynx: No oropharyngeal exudate  Eyes:      General:         Right eye: No discharge  Left eye: No discharge  Conjunctiva/sclera: Conjunctivae normal       Pupils: Pupils are equal, round, and reactive to light  Neck:      Thyroid: No thyromegaly  Trachea: No tracheal deviation  Cardiovascular:      Rate and Rhythm: Normal rate and regular rhythm  Heart sounds: Normal heart sounds  No murmur heard  No friction rub  No gallop  Pulmonary:      Effort: Pulmonary effort is normal  No respiratory distress  Breath sounds: Normal breath sounds  Abdominal:      General: There is no distension  Palpations: Abdomen is soft  Tenderness: There is no abdominal tenderness  There is no guarding or rebound  Musculoskeletal:         General: Normal range of motion  Lymphadenopathy:      Cervical: No cervical adenopathy  Skin:     General: Skin is warm  Neurological:      Mental Status: She is alert and oriented to person, place, and time  Cranial Nerves: No cranial nerve deficit  Psychiatric:         Behavior: Behavior normal          Thought Content: Thought content normal          Judgment: Judgment normal          VIRTUAL VISIT DISCLAIMER    Cierra Payneist verbally agrees to participate in [employer]  Pt is aware that [employer] could be limited without vital signs or the ability to perform a full hands-on physical Allie Marrow understands she or the provider may request at any time to terminate the video visit and request the patient to seek care or treatment in person

## 2022-04-13 LAB — SARS-COV-2 RNA RESP QL NAA+PROBE: NEGATIVE

## 2022-04-14 LAB — BACTERIA THROAT CULT: ABNORMAL

## 2022-06-13 ENCOUNTER — NURSE TRIAGE (OUTPATIENT)
Dept: OTHER | Facility: OTHER | Age: 32
End: 2022-06-13

## 2022-06-13 NOTE — TELEPHONE ENCOUNTER
Reason for Disposition   SEVERE (e g , excruciating) throat pain    Answer Assessment - Initial Assessment Questions  1  ONSET: "When did the throat start hurting?" (Hours or days ago)      Strep thinks has strep     2  SEVERITY: "How bad is the sore throat?" (Scale 1-10; mild, moderate or severe)    - MILD (1-3):  doesn't interfere with eating or normal activities    - MODERATE (4-7): interferes with eating some solids and normal activities    - SEVERE (8-10):  excruciating pain, interferes with most normal activities    - SEVERE DYSPHAGIA: can't swallow liquids, drooling    5-6    3  STREP EXPOSURE: "Has there been any exposure to strep within the past week?" If Yes, ask: "What type of contact occurred?"       Has had strep multiple times and knows she has it    4  VIRAL SYMPTOMS: "Are there any symptoms of a cold, such as a runny nose, cough, hoarse voice or red eyes?"    No    5  FEVER: "Do you have a fever?" If Yes, ask: "What is your temperature, how was it measured, and when did it start?"      Subjective fever per pt    6  PUS ON THE TONSILS: "Is there pus on the tonsils in the back of your throat?"     Yes  7   OTHER SYMPTOMS: "Do you have any other symptoms?" (e g , difficulty breathing, headache, rash)     Denies    Protocols used: SORE THROAT-ADULT-

## 2022-06-13 NOTE — TELEPHONE ENCOUNTER
Regarding: sick appointment sore throat   ----- Message from Erik Osborn sent at 6/13/2022  7:18 PM EDT -----  "I think I have a sore throat and I am looking to make a sick appointment with my pcp"

## 2022-06-13 NOTE — TELEPHONE ENCOUNTER
I didn't see anywhere I could put this patient on tomorrow but didn't know if you guys could figure something out

## 2022-06-14 ENCOUNTER — OFFICE VISIT (OUTPATIENT)
Dept: URGENT CARE | Facility: MEDICAL CENTER | Age: 32
End: 2022-06-14
Payer: COMMERCIAL

## 2022-06-14 VITALS
HEART RATE: 82 BPM | SYSTOLIC BLOOD PRESSURE: 128 MMHG | TEMPERATURE: 97.1 F | DIASTOLIC BLOOD PRESSURE: 84 MMHG | OXYGEN SATURATION: 100 % | RESPIRATION RATE: 16 BRPM

## 2022-06-14 DIAGNOSIS — J02.0 STREP PHARYNGITIS: Primary | ICD-10-CM

## 2022-06-14 LAB — S PYO AG THROAT QL: POSITIVE

## 2022-06-14 PROCEDURE — 99213 OFFICE O/P EST LOW 20 MIN: CPT | Performed by: PHYSICIAN ASSISTANT

## 2022-06-14 PROCEDURE — 87880 STREP A ASSAY W/OPTIC: CPT | Performed by: PHYSICIAN ASSISTANT

## 2022-06-14 RX ORDER — AMOXICILLIN 500 MG/1
500 CAPSULE ORAL EVERY 12 HOURS SCHEDULED
Qty: 20 CAPSULE | Refills: 0 | Status: SHIPPED | OUTPATIENT
Start: 2022-06-14 | End: 2022-06-24

## 2022-06-14 NOTE — PATIENT INSTRUCTIONS
Take antibiotic as prescribed  Complete full dose of antibiotics even if symptoms begin to improve or resolve  This is very contagious; do not share drinks or food with others  Replace your toothbrush in 1-2 days to prevent reinfection  Use OTC Tylenol for fever  Your symptoms should begin to improve over the next couple days  Follow-up with your PCP in 3-5 days if symptoms worsen or do not improve  Go to ER if symptoms become severe

## 2022-06-14 NOTE — PROGRESS NOTES
[facility]        NAME: Derian Booth is a 34 y o  female  : 1990    MRN: [id number]  DATE: 2022  TIME: 8:59 AM    Assessment and Plan   Strep pharyngitis [J02 0]  1  Strep pharyngitis  amoxicillin (AMOXIL) 500 mg capsule    Ambulatory Referral to Otolaryngology    POCT rapid strepA         Patient Instructions   Take antibiotic as prescribed  Complete full dose of antibiotics even if symptoms begin to improve or resolve  This is very contagious; do not share drinks or food with others  Replace your toothbrush in 1-2 days to prevent reinfection  Use OTC Tylenol for fever  Your symptoms should begin to improve over the next couple days  Follow up with PCP in 3-5 days  Proceed to  ER if symptoms worsen  Chief Complaint     Chief Complaint   Patient presents with    Sore Throat     Patient relates started with sore throat x1 day  Denies fever  Denies cough  "Just feeling achy" Vaccinated for covid x2 doses  "I had strep a couple months ago"         History of Present Illness       Patient is a 28-year-old female with no significant past medical history presents the office complaining sore throat since yesterday  Pain is rated 5/10  She has associated fatigue, mild body aches, headache, and crampy abdomen  She denies fever, chills, congestion, cough, SOB, CP, nausea, vomiting, or abdominal pain  Patient reports she has strep pharyngitis few months ago  History recurrent strep infection many years ago which only began occurring again recently  No known direct exposure  Review of Systems   Review of Systems   Constitutional: Negative for chills and fever  HENT: Positive for sore throat  Negative for congestion, postnasal drip and rhinorrhea  Respiratory: Negative for cough and shortness of breath  Cardiovascular: Negative for chest pain and palpitations  Gastrointestinal: Negative for abdominal pain, diarrhea, nausea and vomiting     Musculoskeletal: Positive for myalgias  Neurological: Positive for headaches  Negative for dizziness and light-headedness  Current Medications       Current Outpatient Medications:     amoxicillin (AMOXIL) 500 mg capsule, Take 1 capsule (500 mg total) by mouth every 12 (twelve) hours for 10 days, Disp: 20 capsule, Rfl: 0    citalopram (CeleXA) 40 mg tablet, Take 1 tablet (40 mg total) by mouth daily, Disp: 90 tablet, Rfl: 0    Current Allergies     Allergies as of 06/14/2022    (No Known Allergies)            The following portions of the patient's history were reviewed and updated as appropriate: allergies, current medications, past family history, past medical history, past social history, past surgical history and problem list      Past Medical History:   Diagnosis Date    ADHD (attention deficit hyperactivity disorder)     Anxiety     Chickenpox     Depression     Eczema     Herpes genitalis     Varicella        History reviewed  No pertinent surgical history  Family History   Problem Relation Age of Onset   [facility] Lung cancer Maternal Grandmother     Heart attack Paternal Grandmother    [facility] COPD Mother     Hypertension Mother     Hyperlipidemia Father     Hypertension Father     Breast cancer Neg Hx     Ovarian cancer Neg Hx     Colon cancer Neg Hx          Medications have been verified  Objective   /84   Pulse 82   Temp (!) 97 1 °F (36 2 °C) (Tympanic)   Resp 16   LMP 05/20/2022   SpO2 100%   Patient's last menstrual period was 05/20/2022  Physical Exam     Physical Exam  Vitals and nursing note reviewed  Constitutional:       Appearance: Normal appearance  She is well-developed  HENT:      Head: Normocephalic and atraumatic  Right Ear: Tympanic membrane, ear canal and external ear normal       Left Ear: Tympanic membrane, ear canal and external ear normal       Nose: Nose normal       Mouth/Throat:      Mouth: Mucous membranes are moist       Pharynx: Uvula midline  Pharyngeal swelling and posterior oropharyngeal erythema present  Tonsils: Tonsillar exudate present  No tonsillar abscesses  Eyes:      General: Lids are normal       Conjunctiva/sclera: Conjunctivae normal       Pupils: Pupils are equal, round, and reactive to light  Cardiovascular:      Rate and Rhythm: Normal rate and regular rhythm  Pulses: Normal pulses  Heart sounds: Normal heart sounds  No murmur heard  No friction rub  No gallop  Pulmonary:      Effort: Pulmonary effort is normal       Breath sounds: Normal breath sounds  No wheezing, rhonchi or rales  Abdominal:      General: Bowel sounds are normal       Palpations: Abdomen is soft  Tenderness: There is no abdominal tenderness  Musculoskeletal:         General: Normal range of motion  Cervical back: Neck supple  Lymphadenopathy:      Cervical: Cervical adenopathy present  Skin:     General: Skin is warm and dry  Capillary Refill: Capillary refill takes less than 2 seconds  Neurological:      Mental Status: She is alert  POC rapid strep POSITIVE

## 2022-07-08 DIAGNOSIS — F41.9 ANXIETY: ICD-10-CM

## 2022-07-08 RX ORDER — CITALOPRAM 40 MG/1
40 TABLET ORAL DAILY
Qty: 90 TABLET | Refills: 0 | Status: SHIPPED | OUTPATIENT
Start: 2022-07-08 | End: 2022-10-12 | Stop reason: SDUPTHER

## 2022-09-13 ENCOUNTER — OFFICE VISIT (OUTPATIENT)
Dept: FAMILY MEDICINE CLINIC | Facility: CLINIC | Age: 32
End: 2022-09-13
Payer: COMMERCIAL

## 2022-09-13 VITALS
RESPIRATION RATE: 16 BRPM | DIASTOLIC BLOOD PRESSURE: 78 MMHG | OXYGEN SATURATION: 98 % | HEART RATE: 85 BPM | WEIGHT: 117.2 LBS | BODY MASS INDEX: 21.57 KG/M2 | HEIGHT: 62 IN | SYSTOLIC BLOOD PRESSURE: 122 MMHG | TEMPERATURE: 97.6 F

## 2022-09-13 DIAGNOSIS — F41.9 ANXIETY: ICD-10-CM

## 2022-09-13 DIAGNOSIS — F10.230 ALCOHOL DEPENDENCE WITH UNCOMPLICATED WITHDRAWAL (HCC): Primary | ICD-10-CM

## 2022-09-13 PROBLEM — F10.20 ALCOHOL DEPENDENCE (HCC): Status: ACTIVE | Noted: 2022-09-13

## 2022-09-13 PROCEDURE — 99214 OFFICE O/P EST MOD 30 MIN: CPT | Performed by: FAMILY MEDICINE

## 2022-09-13 RX ORDER — CHLORDIAZEPOXIDE HYDROCHLORIDE 5 MG/1
CAPSULE, GELATIN COATED ORAL
Qty: 25 CAPSULE | Refills: 0 | Status: SHIPPED | OUTPATIENT
Start: 2022-09-13 | End: 2022-09-22

## 2022-09-13 RX ORDER — DIAZEPAM 5 MG/1
5 TABLET ORAL 2 TIMES DAILY
Qty: 60 TABLET | Refills: 0 | Status: SHIPPED | OUTPATIENT
Start: 2022-09-13 | End: 2022-09-13 | Stop reason: SDUPTHER

## 2022-09-13 RX ORDER — DIAZEPAM 5 MG/1
5 TABLET ORAL 2 TIMES DAILY
Qty: 60 TABLET | Refills: 0 | Status: SHIPPED | OUTPATIENT
Start: 2022-09-13 | End: 2022-09-22 | Stop reason: SDUPTHER

## 2022-09-14 ENCOUNTER — TELEPHONE (OUTPATIENT)
Dept: FAMILY MEDICINE CLINIC | Facility: CLINIC | Age: 32
End: 2022-09-14

## 2022-09-14 NOTE — TELEPHONE ENCOUNTER
If patient took the Valium at bedtime and did well and has done well and if she would prefer to do that instead of the Librium,  she can try doing 5 mg b i d  for the next several days and then try going to just 5 mg at bedtime  If she starts to get side effects or withdrawal symptoms she should let us know right away  I plan to see her in a week

## 2022-09-14 NOTE — TELEPHONE ENCOUNTER
----- Message from Helena Ray sent at 9/14/2022  7:59 AM EDT -----  Regarding: Medication unavailable  Please review pt message thank you     ----- Message -----  From: Thuy Patel  Sent: 9/14/2022   7:15 AM EDT  To: [facility]  Subject: Medication unavailable                           Good morning    I want to thank you first for spending time talking with me yesterday  It was very appreciated and I thank you for your nonjudgmental attitude and patience  My issue is yesterday I got my prescription filled and they gave me the Valium  I called [facility] back after I got home and they told me Librium wont be ready until today  I took a 5 mg Valium last night with good results just slight discomfort but wasnt sure how else to get through the night without unpleasant effects  At this point do you recommend starting the Librium this morning when I get it filled? I have not taken any valium today  Thank you!

## 2022-09-15 ENCOUNTER — PATIENT OUTREACH (OUTPATIENT)
Dept: FAMILY MEDICINE CLINIC | Facility: CLINIC | Age: 32
End: 2022-09-15

## 2022-09-15 ENCOUNTER — TELEPHONE (OUTPATIENT)
Dept: PSYCHIATRY | Facility: CLINIC | Age: 32
End: 2022-09-15

## 2022-09-15 DIAGNOSIS — F10.230 ALCOHOL DEPENDENCE WITH UNCOMPLICATED WITHDRAWAL (HCC): Primary | ICD-10-CM

## 2022-09-15 NOTE — TELEPHONE ENCOUNTER
[facility] Intake Questions       Referred by: PCP    Please advise interviewee that they need to answer all questions truthfully to allow for best care and any misrepresentations of information may affect their ability to be seen at this clinic     Was this discussed? Yes      [facility] Program Intake History -        Presenting Problem (in patient's words): Was looking into Naltrexone  PCP prescribed Librium, is not taking it  Feels as if Diazepam is helping with withdrawal        Are there any developmental disabilities? No    Does the patient have a language barrier or hearing impairment? No       Substance Use-       1  Are you being referred for treatment of any of the following: alcohol, benzodiazepines, baclofen, GHB, phenibut, or Soma? Alcohol    2  Are you being referred for the treatment of opioid use? No      3  For what substance use are you being referred to the [facility]? Alcohol       Psychiatric Care-        Do you have a psychiatric diagnosis? Severe anxiety    2  Are you taking any psychiatric medications? Celexa    3  Have you been treated at [facility] by a therapist or a doctor in the past? If yes, who? No       4  Are you currently having thoughts of harming yourself or others? No    5  Have you been hospitalized for mental health? No    6  Do you have a community treatment team or ? Yes, through PCPs office  Legal History-         Do you have any history of arrests, nursing home/USP time, or DUIs? No    Prenatal/         Are you pregnant? No    Have you given birth in the last 28 days?  No       Intake Team, please check with provider before scheduling if flags come up such as:     - complex case     - legal history (other than DUI)     - communication barrier concerns are present     - already being prescribed buprenorphine or methadone     - if, in your judgment, this needs further review        ACCEPTED as a patient Yes   Appointment Date: 10/03/2022 with   Brayan      Referred Elsewhere?  No         Name of Insurance Co:     Insurance ID#     [employer] #     If ins is primary or secondary

## 2022-09-15 NOTE — PROGRESS NOTES
RUBY NEWTON received a referral from patient's PCP regarding patient's need for assistance with establishing substance use treatment  RUBY NEWTON contacted patient and explained the [employer] program  Patient states she would be very much interested in this program and is particularly interested in dual diagnosis services  RUBY NEWTON placed referral from [facility] program at this time   Encouraged patient to reach out to RUBY NEWTON if she does not hear back from [facility] by early next week so that RUBY NEWTON may follow up on the referral

## 2022-09-19 NOTE — PROGRESS NOTES
Assessment/Plan:    42-year-old woman with: Anxiety and alcohol dependence with withdrawal symptoms  Discussed workup and treatment options at length with risks and benefits  Recommended patient consider inpatient detox but she declines due to the need to watch her kids discuss that if she could look into possibilities of getting babysitting temporarily  Discussed use of benzodiazepine to help wean and the risks and benefits of different treatment options  Initially discussed trial of Librium to help wean but as it was not available at the pharmacy [facility] discussed strategies for weaning and follow-up in 1 week advised that if symptoms begin to worsen she should call back immediately go to the emergency room  PAPDMP reviewed and filled  No problem-specific Assessment & Plan notes found for this encounter  Diagnoses and all orders for this visit:    Alcohol dependence with uncomplicated withdrawal ([facility] [address] )  -     Discontinue: diazepam (VALIUM) 5 mg tablet; Take 1 tablet (5 mg total) by mouth 2 (two) times a day  -     Ambulatory Referral to [facility] Program; Future  -     Ambulatory Referral to "[employer]; Future  -     diazepam (VALIUM) 5 mg tablet; Take 1 tablet (5 mg total) by mouth 2 (two) times a day  -     chlordiazePOXIDE (LIBRIUM) 5 mg capsule; Take 2 tabs QID on day 1, then 2 tabs TID on day 2, then 1 tab QID on day 3, then 1 tab BID on day 4, then 1 tab daily  Anxiety          Subjective:     Chief Complaint   Patient presents with    Anxiety     Pt would like to discuss her anxiety and alcohol use  Patient ID: Rochelle Duke is a 34 y o  female      Patient is a 42-year-old woman who presents for follow-up on anxiety along with alcohol abuse and dependence she admits over the past to use her dependence discussed in worsen she feels that she would like some help no suicidal homicidal ideation no fevers chills nausea vomiting    Anxiety  Symptoms include nervous/anxious behavior  The following portions of the patient's history were reviewed and updated as appropriate: allergies, current medications, past family history, past medical history, past social history, past surgical history and problem list     Review of Systems   Constitutional: Negative  HENT: Negative  Eyes: Negative  Respiratory: Negative  Cardiovascular: Negative  Gastrointestinal: Negative  Endocrine: Negative  Genitourinary: Negative  Musculoskeletal: Negative  Allergic/Immunologic: Negative  Neurological: Negative  Hematological: Negative  Psychiatric/Behavioral: The patient is nervous/anxious  All other systems reviewed and are negative  Objective:      /78 (BP Location: Left arm, Patient Position: Sitting, Cuff Size: Standard)   Pulse 85   Temp 97 6 °F (36 4 °C) (Tympanic)   Resp 16   Ht 5' 2" (1 575 m)   Wt 53 2 kg (117 lb 3 2 oz)   SpO2 98%   BMI 21 44 kg/m²          Physical Exam  Constitutional:       Appearance: She is well-developed  HENT:      Head: Atraumatic  Right Ear: External ear normal       Left Ear: External ear normal    Eyes:      Conjunctiva/sclera: Conjunctivae normal       Pupils: Pupils are equal, round, and reactive to light  Cardiovascular:      Rate and Rhythm: Normal rate and regular rhythm  Heart sounds: Normal heart sounds  Pulmonary:      Effort: Pulmonary effort is normal  No respiratory distress  Breath sounds: Normal breath sounds  Abdominal:      General: There is no distension  Palpations: Abdomen is soft  Tenderness: There is no abdominal tenderness  There is no guarding or rebound  Musculoskeletal:         General: Normal range of motion  Cervical back: Normal range of motion  Skin:     General: Skin is warm and dry  Neurological:      Mental Status: She is alert and oriented to person, place, and time  Cranial Nerves: No cranial nerve deficit  Psychiatric:         Behavior: Behavior normal          Thought Content:  Thought content normal          Judgment: Judgment normal

## 2022-09-21 ENCOUNTER — PATIENT OUTREACH (OUTPATIENT)
Dept: FAMILY MEDICINE CLINIC | Facility: CLINIC | Age: 32
End: 2022-09-21

## 2022-09-21 NOTE — PROGRESS NOTES
RUBY NEWTON completed chart review at this time  Determined patient was accepted as a patient through [facility] for dual diagnosis treatment  Patient's first appointment is scheduled for 10-3-2022 with Dr Sophia BELTRAN CM will continue to follow and will remain available as needed

## 2022-09-22 ENCOUNTER — OFFICE VISIT (OUTPATIENT)
Dept: FAMILY MEDICINE CLINIC | Facility: CLINIC | Age: 32
End: 2022-09-22
Payer: COMMERCIAL

## 2022-09-22 VITALS
SYSTOLIC BLOOD PRESSURE: 120 MMHG | OXYGEN SATURATION: 99 % | HEIGHT: 62 IN | BODY MASS INDEX: 21.71 KG/M2 | TEMPERATURE: 95.9 F | DIASTOLIC BLOOD PRESSURE: 80 MMHG | WEIGHT: 118 LBS | HEART RATE: 89 BPM

## 2022-09-22 DIAGNOSIS — F41.9 ANXIETY: Primary | ICD-10-CM

## 2022-09-22 DIAGNOSIS — F10.230 ALCOHOL DEPENDENCE WITH UNCOMPLICATED WITHDRAWAL (HCC): ICD-10-CM

## 2022-09-22 PROCEDURE — 99213 OFFICE O/P EST LOW 20 MIN: CPT | Performed by: FAMILY MEDICINE

## 2022-09-22 RX ORDER — DIAZEPAM 2 MG/1
5 TABLET ORAL
Qty: 90 TABLET | Refills: 0 | Status: SHIPPED | OUTPATIENT
Start: 2022-09-22 | End: 2022-10-08

## 2022-09-26 NOTE — PROGRESS NOTES
Assessment/Plan:    76-year-old woman with: Anxiety and alcohol dependence  Discussed strategies for weaning Valium  Patient has upcoming appointment scheduled for psychotherapy along with toxicology  In the interim will give 2 mg tablets to allow patient to slowly titrate down dose as tolerated to lowest effective dose as long she is not having side effects discussed supportive care return parameters otherwise  Follow-up in 1-2 months    No problem-specific Assessment & Plan notes found for this encounter  Diagnoses and all orders for this visit:    Anxiety    Alcohol dependence with uncomplicated withdrawal (HCC)  -     diazepam (VALIUM) 2 mg tablet; Take 2 5 tablets (5 mg total) by mouth daily at bedtime as needed for anxiety          Subjective:     Chief Complaint   Patient presents with    Physical Exam     Annual physical/ and 1 week follow up        Patient ID: Josias Ying is a 34 y o  female  Patient is a 76-year-old woman who presents for follow-up on anxiety and alcohol dependence  She admits that she is doing very well on the Valium at bedtime  She previously was prescribed the Librium but was unable to get it in stock  She admits she has titrated down to 1 dose of the Valium at bedtime and is not having any withdrawal symptoms  No fevers chills nausea vomiting no other complaints at this time      The following portions of the patient's history were reviewed and updated as appropriate: allergies, current medications, past family history, past medical history, past social history, past surgical history and problem list     Review of Systems   Constitutional: Negative  HENT: Negative  Eyes: Negative  Respiratory: Negative  Cardiovascular: Negative  Gastrointestinal: Negative  Endocrine: Negative  Genitourinary: Negative  Musculoskeletal: Negative  Allergic/Immunologic: Negative  Neurological: Negative  Hematological: Negative  Psychiatric/Behavioral: The patient is nervous/anxious  All other systems reviewed and are negative  Objective:      /80 (BP Location: Right arm, Patient Position: Sitting, Cuff Size: Standard)   Pulse 89   Temp (!) 95 9 °F (35 5 °C) (Tympanic)   Ht 5' 2" (1 575 m)   Wt 53 5 kg (118 lb)   SpO2 99%   BMI 21 58 kg/m²          Physical Exam  Constitutional:       Appearance: She is well-developed  HENT:      Head: Atraumatic  Right Ear: External ear normal       Left Ear: External ear normal    Eyes:      Conjunctiva/sclera: Conjunctivae normal       Pupils: Pupils are equal, round, and reactive to light  Cardiovascular:      Rate and Rhythm: Normal rate and regular rhythm  Heart sounds: Normal heart sounds  Pulmonary:      Effort: Pulmonary effort is normal  No respiratory distress  Breath sounds: Normal breath sounds  Abdominal:      General: Bowel sounds are normal  There is no distension  Palpations: Abdomen is soft  Tenderness: There is no abdominal tenderness  There is no guarding or rebound  Musculoskeletal:         General: Normal range of motion  Cervical back: Normal range of motion  Skin:     General: Skin is warm and dry  Neurological:      Mental Status: She is alert and oriented to person, place, and time  Cranial Nerves: No cranial nerve deficit  Psychiatric:         Behavior: Behavior normal          Thought Content:  Thought content normal          Judgment: Judgment normal

## 2022-10-03 ENCOUNTER — DOCUMENTATION (OUTPATIENT)
Dept: PSYCHIATRY | Facility: CLINIC | Age: 32
End: 2022-10-03

## 2022-10-03 NOTE — PROGRESS NOTES
Note Type:  Note                  Date of Service: 10/03/2022  Service:  [facility]    Note:  Note  Reg Mckeon Heist 34 y o  female [phone]  Attending  Substance Use History     Social History     Substance and Sexual Activity   Alcohol Use Yes    Comment: social etoh, stopped with + UPT        Social History     Substance and Sexual Activity   Drug Use Never       Encounter Type:   Patient Face-to-Face    Start Time 11:15am  End Time 12:00pm    Recovery needs addressed at this meeting:  Peer Support Recovery Support    Note  D  Pt presented in office as new patient  Patient was referred by her PCP to our office for anxiety and alcohol use disorder  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

## 2022-10-06 ENCOUNTER — HOSPITAL ENCOUNTER (INPATIENT)
Facility: HOSPITAL | Age: 32
LOS: 2 days | Discharge: HOME | DRG: 897 | End: 2022-10-08
Attending: EMERGENCY MEDICINE | Admitting: EMERGENCY MEDICINE
Payer: COMMERCIAL

## 2022-10-06 ENCOUNTER — OFFICE VISIT (OUTPATIENT)
Dept: OTHER | Age: 32
End: 2022-10-06
Payer: COMMERCIAL

## 2022-10-06 VITALS
HEART RATE: 79 BPM | DIASTOLIC BLOOD PRESSURE: 89 MMHG | BODY MASS INDEX: 22.26 KG/M2 | SYSTOLIC BLOOD PRESSURE: 142 MMHG | HEIGHT: 62 IN | WEIGHT: 121 LBS

## 2022-10-06 DIAGNOSIS — F10.20 ALCOHOL USE DISORDER, SEVERE, DEPENDENCE (HCC): Primary | ICD-10-CM

## 2022-10-06 DIAGNOSIS — F41.9 ANXIETY: ICD-10-CM

## 2022-10-06 DIAGNOSIS — F98.8 OTHER SPECIFIED BEHAVIORAL AND EMOTIONAL DISORDERS WITH ONSET USUALLY OCCURRING IN CHILDHOOD AND ADOLESCENCE: ICD-10-CM

## 2022-10-06 DIAGNOSIS — F10.930 ALCOHOL WITHDRAWAL SYNDROME WITHOUT COMPLICATION (HCC): ICD-10-CM

## 2022-10-06 PROBLEM — F10.939 ALCOHOL WITHDRAWAL SYNDROME WITH COMPLICATION (HCC): Status: ACTIVE | Noted: 2022-10-06

## 2022-10-06 PROBLEM — D53.9 MACROCYTIC ANEMIA: Status: ACTIVE | Noted: 2022-10-06

## 2022-10-06 LAB
ALBUMIN SERPL BCP-MCNC: 2.9 G/DL (ref 3.5–5)
ALP SERPL-CCNC: 27 U/L (ref 43–122)
ALT SERPL W P-5'-P-CCNC: 15 U/L
AMPHETAMINES SERPL QL SCN: NEGATIVE
ANION GAP SERPL CALCULATED.3IONS-SCNC: 4 MMOL/L (ref 5–14)
ANION GAP SERPL CALCULATED.3IONS-SCNC: 6 MMOL/L (ref 5–14)
ANION GAP SERPL CALCULATED.3IONS-SCNC: 7 MMOL/L (ref 5–14)
AST SERPL W P-5'-P-CCNC: 21 U/L (ref 14–36)
BARBITURATES UR QL: NEGATIVE
BASOPHILS # BLD AUTO: 0.03 THOUSANDS/ΜL (ref 0–0.1)
BASOPHILS NFR BLD AUTO: 1 % (ref 0–1)
BENZODIAZ UR QL: POSITIVE
BILIRUB SERPL-MCNC: 0.14 MG/DL (ref 0.2–1)
BUN SERPL-MCNC: 10 MG/DL (ref 5–25)
BUN SERPL-MCNC: 10 MG/DL (ref 5–25)
BUN SERPL-MCNC: 6 MG/DL (ref 5–25)
CA-I BLD-SCNC: 1.13 MMOL/L (ref 1.12–1.32)
CALCIUM ALBUM COR SERPL-MCNC: 6.8 MG/DL (ref 8.3–10.1)
CALCIUM SERPL-MCNC: 5.9 MG/DL (ref 8.4–10.2)
CALCIUM SERPL-MCNC: 8.4 MG/DL (ref 8.4–10.2)
CALCIUM SERPL-MCNC: 8.6 MG/DL (ref 8.4–10.2)
CHLORIDE SERPL-SCNC: 105 MMOL/L (ref 96–108)
CHLORIDE SERPL-SCNC: 105 MMOL/L (ref 96–108)
CHLORIDE SERPL-SCNC: 118 MMOL/L (ref 96–108)
CO2 SERPL-SCNC: 19 MMOL/L (ref 21–32)
CO2 SERPL-SCNC: 25 MMOL/L (ref 21–32)
CO2 SERPL-SCNC: 27 MMOL/L (ref 21–32)
COCAINE UR QL: NEGATIVE
CREAT SERPL-MCNC: 0.39 MG/DL (ref 0.6–1.2)
CREAT SERPL-MCNC: 0.58 MG/DL (ref 0.6–1.2)
CREAT SERPL-MCNC: 0.65 MG/DL (ref 0.6–1.2)
EOSINOPHIL # BLD AUTO: 0.04 THOUSAND/ΜL (ref 0–0.61)
EOSINOPHIL NFR BLD AUTO: 1 % (ref 0–6)
ERYTHROCYTE [DISTWIDTH] IN BLOOD BY AUTOMATED COUNT: 13.8 % (ref 11.6–15.1)
ETHANOL SERPL-MCNC: <10 MG/DL (ref 0–10)
GFR SERPL CREATININE-BSD FRML MDRD: 117 ML/MIN/1.73SQ M
GFR SERPL CREATININE-BSD FRML MDRD: 121 ML/MIN/1.73SQ M
GFR SERPL CREATININE-BSD FRML MDRD: 138 ML/MIN/1.73SQ M
GLUCOSE SERPL-MCNC: 62 MG/DL (ref 70–99)
GLUCOSE SERPL-MCNC: 88 MG/DL (ref 70–99)
GLUCOSE SERPL-MCNC: 89 MG/DL (ref 70–99)
HCG SERPL QL: NEGATIVE
HCT VFR BLD AUTO: 36.7 % (ref 34.8–46.1)
HGB BLD-MCNC: 12.3 G/DL (ref 11.5–15.4)
IMM GRANULOCYTES # BLD AUTO: 0.02 THOUSAND/UL (ref 0–0.2)
IMM GRANULOCYTES NFR BLD AUTO: 0 % (ref 0–2)
LYMPHOCYTES # BLD AUTO: 2.4 THOUSANDS/ΜL (ref 0.6–4.47)
LYMPHOCYTES NFR BLD AUTO: 36 % (ref 14–44)
MAGNESIUM SERPL-MCNC: 1.9 MG/DL (ref 1.6–2.3)
MCH RBC QN AUTO: 30.6 PG (ref 26.8–34.3)
MCHC RBC AUTO-ENTMCNC: 33.5 G/DL (ref 31.4–37.4)
MCV RBC AUTO: 91 FL (ref 82–98)
METHADONE UR QL: NEGATIVE
MONOCYTES # BLD AUTO: 0.38 THOUSAND/ΜL (ref 0.17–1.22)
MONOCYTES NFR BLD AUTO: 6 % (ref 4–12)
NEUTROPHILS # BLD AUTO: 3.79 THOUSANDS/ΜL (ref 1.85–7.62)
NEUTS SEG NFR BLD AUTO: 56 % (ref 43–75)
NRBC BLD AUTO-RTO: 0 /100 WBCS
OPIATES UR QL SCN: NEGATIVE
OXYCODONE+OXYMORPHONE UR QL SCN: NEGATIVE
PCP UR QL: NEGATIVE
PLATELET # BLD AUTO: 250 THOUSANDS/UL (ref 149–390)
PMV BLD AUTO: 10.5 FL (ref 8.9–12.7)
POTASSIUM SERPL-SCNC: 2.6 MMOL/L (ref 3.5–5.3)
POTASSIUM SERPL-SCNC: 4 MMOL/L (ref 3.5–5.3)
POTASSIUM SERPL-SCNC: 4.7 MMOL/L (ref 3.5–5.3)
PROT SERPL-MCNC: 5.5 G/DL (ref 6.4–8.4)
RBC # BLD AUTO: 4.02 MILLION/UL (ref 3.81–5.12)
SODIUM SERPL-SCNC: 137 MMOL/L (ref 135–147)
SODIUM SERPL-SCNC: 138 MMOL/L (ref 135–147)
SODIUM SERPL-SCNC: 141 MMOL/L (ref 135–147)
THC UR QL: NEGATIVE
WBC # BLD AUTO: 6.66 THOUSAND/UL (ref 4.31–10.16)

## 2022-10-06 PROCEDURE — 93005 ELECTROCARDIOGRAM TRACING: CPT

## 2022-10-06 PROCEDURE — 83735 ASSAY OF MAGNESIUM: CPT | Performed by: EMERGENCY MEDICINE

## 2022-10-06 PROCEDURE — 82330 ASSAY OF CALCIUM: CPT | Performed by: EMERGENCY MEDICINE

## 2022-10-06 PROCEDURE — 85025 COMPLETE CBC W/AUTO DIFF WBC: CPT | Performed by: EMERGENCY MEDICINE

## 2022-10-06 PROCEDURE — 80053 COMPREHEN METABOLIC PANEL: CPT | Performed by: EMERGENCY MEDICINE

## 2022-10-06 PROCEDURE — 80048 BASIC METABOLIC PNL TOTAL CA: CPT | Performed by: PHYSICIAN ASSISTANT

## 2022-10-06 PROCEDURE — 99204 OFFICE O/P NEW MOD 45 MIN: CPT | Performed by: EMERGENCY MEDICINE

## 2022-10-06 PROCEDURE — 80048 BASIC METABOLIC PNL TOTAL CA: CPT | Performed by: EMERGENCY MEDICINE

## 2022-10-06 PROCEDURE — 99223 1ST HOSP IP/OBS HIGH 75: CPT | Performed by: EMERGENCY MEDICINE

## 2022-10-06 PROCEDURE — 84703 CHORIONIC GONADOTROPIN ASSAY: CPT | Performed by: EMERGENCY MEDICINE

## 2022-10-06 PROCEDURE — 80307 DRUG TEST PRSMV CHEM ANLYZR: CPT | Performed by: EMERGENCY MEDICINE

## 2022-10-06 PROCEDURE — 82077 ASSAY SPEC XCP UR&BREATH IA: CPT

## 2022-10-06 RX ORDER — ACETAMINOPHEN 325 MG/1
650 TABLET ORAL EVERY 6 HOURS PRN
Status: DISCONTINUED | OUTPATIENT
Start: 2022-10-06 | End: 2022-10-08 | Stop reason: HOSPADM

## 2022-10-06 RX ORDER — MAGNESIUM HYDROXIDE/ALUMINUM HYDROXICE/SIMETHICONE 120; 1200; 1200 MG/30ML; MG/30ML; MG/30ML
30 SUSPENSION ORAL EVERY 6 HOURS PRN
Status: DISCONTINUED | OUTPATIENT
Start: 2022-10-06 | End: 2022-10-08 | Stop reason: HOSPADM

## 2022-10-06 RX ORDER — CITALOPRAM 20 MG/1
40 TABLET ORAL DAILY
Status: DISCONTINUED | OUTPATIENT
Start: 2022-10-06 | End: 2022-10-08 | Stop reason: HOSPADM

## 2022-10-06 RX ORDER — SODIUM CHLORIDE 9 MG/ML
125 INJECTION, SOLUTION INTRAVENOUS CONTINUOUS
Status: DISCONTINUED | OUTPATIENT
Start: 2022-10-06 | End: 2022-10-07

## 2022-10-06 RX ORDER — ENOXAPARIN SODIUM 100 MG/ML
40 INJECTION SUBCUTANEOUS DAILY
Status: DISCONTINUED | OUTPATIENT
Start: 2022-10-06 | End: 2022-10-08 | Stop reason: HOSPADM

## 2022-10-06 RX ORDER — FOLIC ACID 1 MG/1
1 TABLET ORAL DAILY
Status: DISCONTINUED | OUTPATIENT
Start: 2022-10-06 | End: 2022-10-08 | Stop reason: HOSPADM

## 2022-10-06 RX ORDER — PHENOBARBITAL 64.8 MG/1
64.8 TABLET ORAL ONCE
Status: COMPLETED | OUTPATIENT
Start: 2022-10-06 | End: 2022-10-06

## 2022-10-06 RX ORDER — DIAZEPAM 5 MG/ML
10 INJECTION, SOLUTION INTRAMUSCULAR; INTRAVENOUS ONCE
Status: COMPLETED | OUTPATIENT
Start: 2022-10-06 | End: 2022-10-06

## 2022-10-06 RX ORDER — LANOLIN ALCOHOL/MO/W.PET/CERES
100 CREAM (GRAM) TOPICAL DAILY
Status: DISCONTINUED | OUTPATIENT
Start: 2022-10-06 | End: 2022-10-08 | Stop reason: HOSPADM

## 2022-10-06 RX ORDER — ONDANSETRON 2 MG/ML
4 INJECTION INTRAMUSCULAR; INTRAVENOUS EVERY 6 HOURS PRN
Status: DISCONTINUED | OUTPATIENT
Start: 2022-10-06 | End: 2022-10-08 | Stop reason: HOSPADM

## 2022-10-06 RX ORDER — NALTREXONE HYDROCHLORIDE 50 MG/1
50 TABLET, FILM COATED ORAL DAILY
Status: DISCONTINUED | OUTPATIENT
Start: 2022-10-06 | End: 2022-10-08 | Stop reason: HOSPADM

## 2022-10-06 RX ADMIN — FOLIC ACID 1 MG: 1 TABLET ORAL at 15:44

## 2022-10-06 RX ADMIN — MULTIPLE VITAMINS W/ MINERALS TAB 1 TABLET: TAB ORAL at 15:43

## 2022-10-06 RX ADMIN — SODIUM CHLORIDE 125 ML/HR: 0.9 INJECTION, SOLUTION INTRAVENOUS at 15:44

## 2022-10-06 RX ADMIN — SODIUM CHLORIDE 125 ML/HR: 0.9 INJECTION, SOLUTION INTRAVENOUS at 23:05

## 2022-10-06 RX ADMIN — THIAMINE HCL TAB 100 MG 100 MG: 100 TAB at 15:43

## 2022-10-06 RX ADMIN — DIAZEPAM 10 MG: 5 INJECTION, SOLUTION INTRAMUSCULAR; INTRAVENOUS at 16:12

## 2022-10-06 RX ADMIN — Medication 650 MG: at 16:12

## 2022-10-06 RX ADMIN — NALTREXONE HYDROCHLORIDE 50 MG: 50 TABLET, FILM COATED ORAL at 15:43

## 2022-10-06 RX ADMIN — PHENOBARBITAL 64.8 MG: 64.8 TABLET ORAL at 20:18

## 2022-10-06 NOTE — ASSESSMENT & PLAN NOTE
· Pt is at a bit higher risk for severe withdrawal given the number of times she has had in the past  She is already experiencing some tremor and anxiety with last drink being last night  · She attempted ambulatory detox with valium about 2 weeks ago  She was able to go about 4 days without alcohol and did OK   However, she suffered recurrence of use quickly and has been drinking larger amount of alcohol daily for the past 10 days  · Given the risk of more sever withdrawal this time around and the failure of ambulatory detox recently, I recommended inpatient withdrawal management here at [address] [place] [address] Detox unit  She is agreeable to this and is making arrangements  She will work with the [facility] , who will help facilitate direct admit likely later to day or tomorrow  I discussed with Detox Unit attending, Dr Arianna Mejia to let him know the plan  · We discussed MAT options  She is interested in naltrexone  PDMP negative for opioid prescriptions  She has no other opioid use  We can consider naltrexone if transaminases are OK on CMP when admitted to the detox unit

## 2022-10-06 NOTE — LETTER
[address] [id number]  [phone]  Dept: [phone]    October 8, 2022     Patient: Thuy Patel   YOB: 1990   Date of Visit: 10/6/2022       To Whom it May Concern:    Adelina Jaime is under my professional care  She was seen in the hospital from 10/6/2022   to 10/08/22  She may return to school on 10/14/22 without limitations  If you have any questions or concerns, please don't hesitate to call           Sincerely,          Paul Snyder PA-C

## 2022-10-06 NOTE — ASSESSMENT & PLAN NOTE
· Stable, no evidence of acute bleeding   · Likely 2/2 myelosuppression from chronic alcohol use    · Continue daily thiamine/folic acid  · Continue to monitor CBC  · Encourage alcohol cessation

## 2022-10-06 NOTE — ASSESSMENT & PLAN NOTE
· H/o chronic alcohol use  · Continue daily thiamine/folic acid/ multivitamin  · Naltrexone started yesterday- tolerating well, prefers to continue with pill at this time (not interested in Vivitrol injection currently)  · Consult case management for assistance with aftercare planning

## 2022-10-06 NOTE — ASSESSMENT & PLAN NOTE
· Pt is starting to exhibit subtle evidence of withdrawal this AM    · Plan is admission to detox unit later today most likely

## 2022-10-06 NOTE — H&P
HISTORY & PHYSICAL EXAM  DEPARTMENT OF MEDICAL TOXICOLOGY  LEVEL 4 MEDICAL DETOX UNIT  Jose Alfredo Patel 34 y o  female MRN: [id number]  Unit/Bed#: 5T DETOX 342-57 Encounter: [id number]      Reason for Admission/Principal Problem: Ethanol withdrawal, Ethanol use disorder  Admitting Provider: Dede Cardoza  Attending Provider: Chandni Lagunas DO   10/6/2022  3:06 PM        Macrocytic anemia  Assessment & Plan  Per record  Associated with ETOH use  Reassess labs today  Alcohol withdrawal syndrome with complication [facility])  Assessment & Plan  Patient consumes ethanol daily with history of worsening withdrawal, increasing use and recent failed outpatient diazepam treatment  Admit, continue SEWS protocol  Alcohol use disorder, severe, dependence ([facility] [address] )  Assessment & Plan  Continue MV, folate, thiamine  Patient already engaged with [facility] and will follow up there  Will start on naltrexone here and then vivitrol prior to discharge  Anxiety  Assessment & Plan  Continue home citalopram 40mg daily  VTE Prophylaxis: Enoxaparin (Lovenox)  / sequential compression device   Code Status: full      Anticipated Length of Stay:  Patient will be admitted on an Inpatient basis with an anticipated length of stay of  2  midnights  Justification for Hospital Stay: alcohol wd    For any questions or concerns, please Tiger Text the advanced practitioner in the role of [facility] [place]-DETOX-AP On Call      This patient qualifies for Level IV medically managed intensive inpatient services under the criteria set by the American Society of Addiction Medicine, including dimensions 1-3   The patient is in withdrawal (or is intoxicated with high risk of withdrawal), with severe and unstable medical and/or psychiatric (dual diagnosis) problems, requiring requires 24-hour medical and nursing care and the full resources of a [address] patient to medical detox unit and continue supportive care and stabilization of acute ethanol withdrawal per medical toxicology/detox treatment pathway  Monitor ethanol withdrawal severity via the Severity of Ethanol Withdrawal Scale (SEWS) Q4 hours and then hourly if/when SEWS > 6  Treat withdrawal per pathway and reassess Q30-60 minutes  Mild SEWS Score 1-6  Administer medications* (IV or PO; PO preferred):  • If initial SEWS score: diazepam 10mg PO/IV x 1 AND phenobarbital 65 mg PO/IV x 1  • If repeat SEWS score 1-6: phenobarbital 65 mg PO/IV q1 hour x 5 doses maximum   Reassessment:   • SEWS q1 hour after each dose until SEWS 0 x 2 hours  • VS q1 hours (until SEWS 0, then q4 hours)  • Notify provider for bedside evaluation if 5-dose maximum is reached, RASS of -3 to -5, or SEWS score escalates to moderate or severe  Moderate SEWS Score 7-12  Administer medications* (IV):  • If initial SEWS score: diazepam 10mg IV x 1 AND phenobarbital 260 mg IV x 1  • If repeat SEWS score 7-12 or score escalated from mild: phenobarbital 130 mg IV q30 minutes x 5 doses maximum   Reassessment:  • SEWS q30 minutes after each dose until SEWS < 7 (then hourly until SEWS 0 x 2 hours)  • VS q30 minutes until SEWS < 7 (then hourly until SEWS 0, then q4 hours)  • Notify provider for bedside evaluation if 5-dose maximum is reached, RASS of -3 to -5, or SEWS score escalates to severe  Severe SEWS Score ? 13  Administer medications* (IV):  • If initial SEWS score: Diazepam 10 mg IV x 1 AND phenobarbital 650 mg IV piggyback x 1 over 15-30 minutes  • If repeat SEWS score ?  13 or score escalated from mild or moderate: phenobarbital 130 mg IV q30 minutes x 5 doses maximum   Reassessment:  • SEWS q30 minutes after each dose until SEWS < 7 (then hourly until SEWS 0 x 2 hours)   • VS q30 minutes until SEWS < 7 (then hourly until SEWS 0, then q4 hours)  • Notify provider for bedside evaluation if 5-dose maximum is reached or RASS of -3 to -5 *Hold medications and notify provider if CNS depression, respirations < 10/min, or RASS of -3 to -5  Medications to be administered adjunctively if more than 2 grams of phenobarbital is needed for stabilization of withdrawal; require attending approval    • Dexmedetomidine infusion 0 1-1mcg/kg/hr IV infusion, titratable to reduced agitation (Goal: RASS -2)  • Ketamine   o Acute agitated delirium: 1-2 mg/kg IV or 4-5 mg/kg IM  o Refractory withdrawal: 0 1-1mg/kg/hr IV infusion, titratable to reduced agitation (Goal: RASS -2)    Further evaluation, screening and treatment:  Evaluate complete metabolic panel, transaminases, INR, and lipase  Assess hepatic ultrasound for any sign of alcoholic liver disease or cirrhosis, and ultimately refer for further hepatic evaluation and care as/if indicated  Additional medications for ethanol associated malnutrition: Thiamine 100 mg IV daily, increase to 500 mg TID for signs/symptoms of Wernicke's Encephalopathy or Wernicke Korsakoff Syndrome   Folic acid 1 mg IV daily   Multivitamin PO daily      Will offer first monthly injection of Naltrexone 380 mg IM, once patient is stabilized, as it has been shown to assist in decreasing cravings for ethanol  Evaluate and treat for coexisting substance use, such as opioids and nicotine  Discuss risk factors for infectious disease, such as history of intravenous drug abuse, and offer hepatitis and HIV screening if indicated  Case management consultation to assist with coordination of subsequent treatment after discharge  HPI: Magdiel Cuadra is a 34y o  year old female who presents with request for detox from ethanol  She has been consuming alcohol problematically for ten years but has increased her intake during hte last year to include drinking most days early in the day  Sleep has been continuously disrupted       Quantity and frequency of alcohol intake: daily   Date/Time of last alcohol intake: this afternoon   Current signs and symptoms of ethanol withdrawal: anxiety and tremor    SEWS Total Score: 13 (10/6/2022  3:49 PM)          Ethanol Withdrawal History  Previous ethanol withdrawal? yes  Prior inpatient treatment for ethanol withdrawal? no  Prior outpatient treatment for ethanol withdrawal? yes  History of seizures with prior ethanol withdrawal? no  Prior treatment with naltrexone (Vivitrol)? no  Current treatment with naltrexone (Vivitrol)? no  Other current treatment for ethanol use disorder? no  Co-existing substance use? no    Review of PDMP: yes     Social History     Substance and Sexual Activity   Alcohol Use Yes   • Alcohol/week: 24 0 standard drinks   • Types: 12 Cans of beer, 12 Shots of liquor per week    Comment: 12 drinks per day  Alternate between liquor and beer     Social History     Substance and Sexual Activity   Drug Use Never     Social History     Tobacco Use   Smoking Status Former Smoker   Smokeless Tobacco Never Used       Review of Systems   Constitutional: Negative  HENT: Negative  Eyes: Negative  Respiratory: Negative  Cardiovascular: Negative  Gastrointestinal: Negative  Musculoskeletal: Negative  Skin: Negative  Neurological: Positive for tremors  Psychiatric/Behavioral: The patient is nervous/anxious  Historical Information   Past Medical History:   Diagnosis Date   • ADHD (attention deficit hyperactivity disorder)    • Anxiety    • Chickenpox    • Depression    • Eczema    • Herpes genitalis    • Varicella      History reviewed  No pertinent surgical history    Family History   Problem Relation Age of Onset   • COPD Mother    • Hypertension Mother    • Hyperlipidemia Father    • Hypertension Father    • Addiction problem Brother    • Lung cancer Maternal Grandmother    • Heart attack Paternal Grandmother    • Breast cancer Neg Hx    • Ovarian cancer Neg Hx    • Colon cancer Neg Hx      Social History   Marital Status: Single   Occupation: nurse   Patient Pre-hospital Living Situation: lives with two children  Patient Pre-hospital Level of Mobility: no limitations         No Known Allergies    Prior to Admission medications    Medication Sig Start Date End Date Taking? Authorizing Provider   citalopram (CeleXA) 40 mg tablet Take 1 tablet (40 mg total) by mouth daily 7/8/22 10/6/22  Shelly Morales MD   diazepam (VALIUM) 2 mg tablet Take 2 5 tablets (5 mg total) by mouth daily at bedtime as needed for anxiety  Patient not taking: Reported on 10/6/2022 9/22/22   Shelly Morales MD       Current Facility-Administered Medications   Medication Dose Route Frequency   • acetaminophen (TYLENOL) tablet 650 mg  650 mg Oral Q6H PRN   • aluminum-magnesium hydroxide-simethicone (MYLANTA) oral suspension 30 mL  30 mL Oral Q6H PRN   • citalopram (CeleXA) tablet 40 mg  40 mg Oral Daily   • enoxaparin (LOVENOX) subcutaneous injection 40 mg  40 mg Subcutaneous Daily   • folic acid (FOLVITE) tablet 1 mg  1 mg Oral Daily   • multivitamin-minerals (CENTRUM) tablet 1 tablet  1 tablet Oral Daily   • naltrexone (REVIA) tablet 50 mg  50 mg Oral Daily   • ondansetron (ZOFRAN) injection 4 mg  4 mg Intravenous Q6H PRN   • sodium chloride 0 9 % infusion  125 mL/hr Intravenous Continuous   • thiamine tablet 100 mg  100 mg Oral Daily       Continuous Infusions:sodium chloride, 125 mL/hr, Last Rate: 125 mL/hr (10/06/22 1544)             Objective     No intake or output data in the 24 hours ending 10/06/22 1551    Invasive Devices:        Vitals   Vitals:    10/06/22 1515   BP: 157/92   TempSrc: Temporal   Pulse: 100   Resp: 18   Patient Position - Orthostatic VS: Sitting   Temp: 98 7 °F (37 1 °C)       Physical Exam  Vitals and nursing note reviewed  Constitutional:       General: She is not in acute distress  HENT:      Head: Normocephalic and atraumatic        Nose: Nose normal       Mouth/Throat:      Mouth: Mucous membranes are moist    Eyes:      Pupils: Pupils are equal, round, and reactive to light  Cardiovascular:      Rate and Rhythm: Normal rate  Pulmonary:      Effort: Pulmonary effort is normal    Abdominal:      General: Abdomen is flat  Musculoskeletal:         General: Normal range of motion  Cervical back: Normal range of motion  Skin:     General: Skin is warm  Neurological:      General: No focal deficit present  Mental Status: She is alert and oriented to person, place, and time  Psychiatric:         Attention and Perception: Attention normal          Mood and Affect: Mood is anxious  Speech: Speech normal          Behavior: Behavior is cooperative  Cognition and Memory: Cognition normal              Data:    EKG, Pathology, and Other Studies: I have personally reviewed pertinent reports          Lab Results:  CBC ETOH     Lab Results   Component Value Date    WBC 7 32 12/07/2021    RBC 3 79 (L) 12/07/2021    HGB 12 0 12/07/2021    HCT 39 6 12/07/2021     (H) 12/07/2021    MCH 31 7 12/07/2021    MCHC 30 3 (L) 12/07/2021    RDW 13 6 12/07/2021     12/07/2021    MPV 12 6 12/07/2021      No results found for: LACTICACID   CMP UA         Component Value Date/Time    K 4 0 12/07/2021 1223     12/07/2021 1223    CO2 26 12/07/2021 1223    BUN 12 12/07/2021 1223    CREATININE 0 71 12/07/2021 1223         Component Value Date/Time    CALCIUM 8 8 12/07/2021 1223    ALKPHOS 26 (L) 12/07/2021 1223    AST 21 12/07/2021 1223    ALT 27 12/07/2021 1223      Lab Results   Component Value Date    CLARITYU slight cloudy 11/02/2018    CLARITYU Cloudy 09/07/2016    COLORU light 11/02/2018    COLORU Yellow 09/07/2016    SPECGRAV 1 019 09/07/2016    PHUR 7 0 09/07/2016    GLUCOSEU normal 11/02/2018    GLUCOSEU Negative 09/07/2016    KETONESU neg 11/02/2018    KETONESU Negative 09/07/2016    BLOODU neg 11/02/2018    BLOODU Negative 09/07/2016    PROTEIN UA neg 11/02/2018    NITRITE neg 11/02/2018    NITRITE Negative 09/07/2016 BILIRUBINUR neg 11/02/2018    BILIRUBINUR Negative 09/07/2016    UROBILINOGEN normal 11/02/2018    LEUKOCYTESUR neg 11/02/2018    LEUKOCYTESUR Negative 09/07/2016        Liver Function Test: ASA     Lab Results   Component Value Date    TBILI 0 34 12/07/2021    ALKPHOS 26 (L) 12/07/2021    AST 21 12/07/2021    ALT 27 12/07/2021    TP 7 2 12/07/2021    ALB 3 9 12/07/2021      No results found for: SALICYLATE   Troponin APAP     No results found for: TROPONINI   No results found for: ACTMNPHEN   VBG HCG     No results found for: PHVEN, OOK4EFZ, PO2VEN, NYA5OHA, BEVEN, X9TEVSAEW, C0DWYHZ   No results found for: HCGQUANT   ABG Urine Drug Screen     No results found for: PHART, DLH9THB, PO2ART, YZB1HLT, BEART, I9DPDRKPX, O2HGB, SOURC, DAVID, VTAC, ACRATE, INSPIREDAIR, PEEP   No results found for: AMPMETHUR, BARBTUR, BDZUR, COCAINEUR, METHADONEUR, OPIATEUR, PCPUR, THCUR, OXYCODONEUR   Lactate INR     No results found for: LACTICACID   No results found for: INR   PTT Protime     No results found for: PTT     No results found for: PROTIME           Counseling / Coordination of Care  Total floor / unit time spent today 45 minutes  Greater than 50% of total time was spent with the patient and / or family counseling and / or coordination of care  ** Please Note: This note has been constructed using a voice recognition system   **

## 2022-10-06 NOTE — ASSESSMENT & PLAN NOTE
· Patient consumes ethanol daily with history of worsening withdrawal, increasing use   · recently failed outpatient diazepam treatment    · SEWS protocol with symptom-triggered phenobarbital followed for medically-assisted alcohol withdrawal  · Received 714 8 mg phenobarbital total thus far, last dose administered 10/6/2022 2018  · Currently appears stable- no tremors, VSS  · Continue to monitor vitals, symptoms and administer additional phenobarbital as indicated

## 2022-10-06 NOTE — PROGRESS NOTES
SH[facility] Program     History and Physical  Raman Patel 34 y o  female MRN: [id number]   @ Encounter: [id number]       1  Alcohol use disorder, severe, dependence ([facility] )  Assessment & Plan:  · Pt is at a bit higher risk for severe withdrawal given the number of times she has had in the past  She is already experiencing some tremor and anxiety with last drink being last night  · She attempted ambulatory detox with valium about 2 weeks ago  She was able to go about 4 days without alcohol and did OK   However, she suffered recurrence of use quickly and has been drinking larger amount of alcohol daily for the past 10 days  · Given the risk of more sever withdrawal this time around and the failure of ambulatory detox recently, I recommended inpatient withdrawal management here at [facility] Detox unit  She is agreeable to this and is making arrangements  She will work with the [facility] , who will help facilitate direct admit likely later to day or tomorrow  I discussed with Detox Unit attending, Dr Garrick Medrano to let him know the plan  · We discussed MAT options  She is interested in naltrexone  PDMP negative for opioid prescriptions  She has no other opioid use  We can consider naltrexone if transaminases are OK on CMP when admitted to the detox unit  Orders:  -     Transfer to other facility    2  Alcohol withdrawal syndrome without complication ([facility] )  Assessment & Plan:  · Pt is starting to exhibit subtle evidence of withdrawal this AM    · Plan is admission to detox unit later today most likely  In addition to the above recommendations, the patient will also be referred to the [facility] Certified Addiction Counselors for additional evaluation and psychotherapy  We will also engage our Certified  for patient support  HPI: Esperanza Chatterjee is a 34y o  year old female who presents with alcohol use  The patient first started drinking on the weekends around age 14-12   She would occasionally drink to blackout at that time  That anselmo persisted until college when her alcohol use escalated to daily use  In college, she was consuming about 12-18 drinks per day of liquor and beer  She had multiple attempts at stopping during that time, but was unable to due development of withdrawal symptoms of anxiety, insomnia, sweats, tremor  She has has 2 periods of abstinence during pregnancy x2  From the age of [age], he alcohol use was much less, about 2 days per month  However, around age 31, she noticed her alcohol use started to escalate again and became daily  She again was unable to stop due to withdrawal symptoms  She was most recently consuming about a half a fifth of vodka per day along with beer  She has never drank at work or gone to work intoxicated  Her alcohol use was most during her off days  However, she would drink after work  About 2 weeks ago, she attemtped an ambulatory detox with valium through her PCP  She was able to stop drinking for about 4 days, but suffered recurrence of use shortly thereafter  Alcohol History   Preferred alcoholic beverage(s): vodka and beer   Quantity and frequency of alcohol intake: half a fifth per day of vodka and several beers  Use of any ethanol substitutes (toxic alcohols): no   Date/Time of last alcohol intake: last night   Current signs and symptoms of ethanol withdrawal:anxiety, tremor and diaphoresis     Alcohol Withdrawal History   Previous ethanol withdrawal? yes   Prior inpatient treatment for ethanol withdrawal? no   Prior outpatient treatment for ethanol withdrawal? yes   History of seizures with prior ethanol withdrawal? no     Review of PDMP:     PDMP Review       Value Time User    PDMP Reviewed  Yes 10/6/2022  8:42 AM Shashi Gooden,              Social History     Tobacco Use   Smoking Status Former Smoker   Smokeless Tobacco Never Used        Review of Systems   Constitutional: Negative for chills and fever     HENT: Negative for ear pain and sore throat  Eyes: Negative for pain and visual disturbance  Respiratory: Negative for cough and shortness of breath  Cardiovascular: Negative for chest pain and palpitations  Gastrointestinal: Negative for abdominal pain and vomiting  Genitourinary: Negative for dysuria and hematuria  Musculoskeletal: Negative for arthralgias and back pain  Skin: Negative for color change and rash  Neurological: Negative for seizures and syncope  Psychiatric/Behavioral: The patient is nervous/anxious  All other systems reviewed and are negative  ROS     Historical Information      Past Medical History:   Diagnosis Date    ADHD (attention deficit hyperactivity disorder)     Anxiety     Chickenpox     Depression     Eczema     Herpes genitalis     Varicella         History reviewed  No pertinent surgical history  Family History   Problem Relation Age of Onset   Wash Caller COPD Mother    Wash Caller Hypertension Mother     Hyperlipidemia Father     Hypertension Father     Addiction problem Brother     Lung cancer Maternal Grandmother     Heart attack Paternal Grandmother     Breast cancer Neg Hx     Ovarian cancer Neg Hx     Colon cancer Neg Hx         Social History      Marital Status: Single    Occupation: Nurse    Patient Pre-hospital Living Situation: stable housing     Communicable diseases:     Prior negative TB screening  No risk factors or active symptoms  Pt had neg hepatitis and HIV screening earlier this year  No Known Allergies     Prior to Admission medications    Medication Sig Start Date End Date Taking?  Authorizing Provider   citalopram (CeleXA) 40 mg tablet Take 1 tablet (40 mg total) by mouth daily 7/8/22 10/6/22 Yes Samantha Echevarria MD   diazepam (VALIUM) 2 mg tablet Take 2 5 tablets (5 mg total) by mouth daily at bedtime as needed for anxiety  Patient not taking: Reported on 10/6/2022 9/22/22   Samantha Echevarria MD       This patient does not have an active medication from one of the medication groupers  Objective      Vitals    Vitals:    10/06/22 0909   BP: 142/89   Pulse: 79       Physical Exam  Constitutional:       General: She is not in acute distress  Appearance: Normal appearance  HENT:      Head: Atraumatic  Mouth/Throat:      Mouth: Mucous membranes are moist    Eyes:      Extraocular Movements: Extraocular movements intact  Cardiovascular:      Rate and Rhythm: Normal rate  Heart sounds: Normal heart sounds  Pulmonary:      Effort: Pulmonary effort is normal       Breath sounds: Normal breath sounds  Abdominal:      Palpations: Abdomen is soft  Musculoskeletal:         General: Normal range of motion  Cervical back: Normal range of motion  Neurological:      Mental Status: She is alert and oriented to person, place, and time  Gait: Gait normal       Comments: Mild intention tremor with mild tongue fasciculations  Psychiatric:         Mood and Affect: Mood normal          Behavior: Behavior normal           COWS Score:          Data:     Lab Results:                Hepatitis panel:        HIV results:       TB:        Imaging Studies: I have personally reviewed pertinent reports  EKG, Pathology, and Other Studies: I have personally reviewed pertinent reports  Counseling / Coordination of Care     Total time spent today 45 minutes  Greater than 50% of total time was spent with the patient and / or family counseling and / or coordination of care  ** Please Note: This note may have been constructed using a voice recognition system   **     Dave Urena DO

## 2022-10-06 NOTE — LETTER
October 8, 2022       No Recipients    Patient: Aristides Patel   YOB: 1990   Date of Visit: 10/6/2022       Dear Dr Savannah Harris Recipients: Thank you for referring Kaelageremias Langford to me for evaluation  Below are my notes for this consultation  If you have questions, please do not hesitate to call me  I look forward to following your patient along with you  Sincerely,        No name on file  CC:   No Recipients  Roseann Reno PA-C  10/7/2022 12:35 PM  Attested  [facility]  Progress Note - Aristides Patel 1990, 34 y o  female MRN: [id number]  Unit/Bed#: 5T DETOX 101-76 Encounter: [id number]  Primary Care Provider: Stefan Denise MD   Date and time admitted to hospital: 10/6/2022  3:06 PM    [address] [place] [address] LEVEL 4  Department of Medical Toxicology  Reason for Admission/Principal Problem: alcohol withdrawal   Rounding Provider: Roseann Reno PA-C, Luis May MD     * Alcohol withdrawal syndrome with complication [facility])  Assessment & Plan  · Patient consumes ethanol daily with history of worsening withdrawal, increasing use   · recently failed outpatient diazepam treatment    · SEWS protocol with symptom-triggered phenobarbital followed for medically-assisted alcohol withdrawal  · Received 714 8 mg phenobarbital total thus far, last dose administered 10/6/2022 2018  · Currently appears stable- no tremors, VSS  · Continue to monitor vitals, symptoms and administer additional phenobarbital as indicated      Alcohol use disorder, severe, dependence (HCC)  Assessment & Plan  · H/o chronic alcohol use  · Continue daily thiamine/folic acid/ multivitamin  · Naltrexone started yesterday- tolerating well, prefers to continue with pill at this time (not interested in Vivitrol injection currently)  · Consult case management for assistance with aftercare planning    Macrocytic anemia  Assessment & Plan  · Stable, no evidence of acute bleeding · Likely 2/2 myelosuppression from chronic alcohol use  · Continue daily thiamine/folic acid  · Continue to monitor CBC  · Encourage alcohol cessation     Anxiety  Assessment & Plan  · Continue home citalopram 40mg daily  · Recommend outpatient f/u PCP/psychiatry     VTE Pharmacologic Prophylaxis:   Pharmacologic: Enoxaparin (Lovenox)- refused   Mechanical VTE Prophylaxis in Place: yes    Code Status: Level 1 - Full Code    Patient Centered Rounds: I have performed bedside rounds with nursing staff today  Discussions with Specialists or Other Care Team Provider: Dr Muna Cummins, [facility]   Education and Discussions with Family / Patient: I personally did not discuss patient with family at this time  Discussed current plan with patient, answered all questions to best of my ability  Time Spent for Care: 20 minutes  More than 50% of total time spent on counseling and coordination of care as described above  Current Length of Stay: 1 day(s)    Current Patient Status: Inpatient     Certification Statement: The patient will continue to require additional inpatient hospital stay due to ongoing monitoring/maintenance of alcohol withdrawal  Discharge Plan: home once medically stable- anticipate in next 24-48 hrs       Total time spent today 20 minutes  Greater than 50% of total time was spent with the patient and / or family counseling and / or coordination of care  A description of the counseling / coordination of care: alcohol use, withdrawal, naltrexone     Subjective:   Patient seen and examined bedside this morning  Reports weak appetite, otherwise denies acute complaints  Currently denies headaches, lightheadedness/dizziness, coughing/sneezing/congestion/rhinorrhea, chest pain, SOB/dyspnea, abdominal pain, N/V/C, dysuria/hematuria, hallucinations  Reports that she will likely continue with naltrexone PO at this time  Interested in outpatient resources upon discharge       Objective:     Clinical Opiate Withdrawal Scale  Pulse: 62    SEWS Total Score: 0 (10/7/2022  7:30 AM)    Last 24 Hours Medication List:   Current Facility-Administered Medications   Medication Dose Route Frequency Provider Last Rate   • acetaminophen  650 mg Oral Q6H PRN Ollis Erika Nappe, DO     • aluminum-magnesium hydroxide-simethicone  30 mL Oral Q6H PRN Ollis Erika Nappe, DO     • citalopram  40 mg Oral Daily Ollis Erika Nappe, DO     • enoxaparin  40 mg Subcutaneous Daily Ollis Erika Nappe, DO     • folic acid  1 mg Oral Daily Ollis Erika Nappe, DO     • multivitamin-minerals  1 tablet Oral Daily Ollis Erika Nappe, DO     • naltrexone  50 mg Oral Daily Ollis Erika Nappe, DO     • ondansetron  4 mg Intravenous Q6H PRN Ollis Erika Nappe, DO     • thiamine  100 mg Oral Daily Ollis Erika Nappe, DO           Vitals:   Temp (24hrs), Av 5 °F (36 9 °C), Min:97 6 °F (36 4 °C), Max:98 9 °F (37 2 °C)    Temp:  [97 6 °F (36 4 °C)-98 9 °F (37 2 °C)] 98 7 °F (37 1 °C)  HR:  [] 62  Resp:  [16-18] 18  BP: (123-157)/(70-92) 127/71  SpO2:  [95 %-99 %] 96 %  Body mass index is 22 13 kg/m²  Input and Output Summary (last 24 hours): Intake/Output Summary (Last 24 hours) at 10/7/2022 1233  Last data filed at 10/7/2022 1004  Gross per 24 hour   Intake 1380 ml   Output --   Net 1380 ml       Physical Exam:   Physical Exam  Vitals and nursing note reviewed  Constitutional:       General: She is not in acute distress  Appearance: Normal appearance  She is well-developed and normal weight  She is not ill-appearing or diaphoretic  HENT:      Head: Normocephalic and atraumatic  Eyes:      General: No scleral icterus  Extraocular Movements:      Right eye: No nystagmus  Left eye: No nystagmus  Conjunctiva/sclera: Conjunctivae normal       Pupils: Pupils are equal, round, and reactive to light  Cardiovascular:      Rate and Rhythm: Normal rate and regular rhythm  Pulses: Normal pulses        Heart sounds: Normal heart sounds  No friction rub  No gallop  Pulmonary:      Effort: Pulmonary effort is normal  No respiratory distress  Breath sounds: Normal breath sounds  No wheezing, rhonchi or rales  Abdominal:      General: Abdomen is flat  Bowel sounds are normal  There is no distension  Palpations: Abdomen is soft  Tenderness: There is no abdominal tenderness  There is no guarding  Musculoskeletal:      Cervical back: Neck supple  Skin:     General: Skin is warm and dry  Neurological:      General: No focal deficit present  Mental Status: She is alert and oriented to person, place, and time  Mental status is at baseline  Motor: No tremor  Coordination: Finger-Nose-Finger Test normal    Psychiatric:         Attention and Perception: Attention normal          Mood and Affect: Mood is anxious  Speech: Speech normal          Behavior: Behavior is cooperative  Additional Data:     Labs: keep all most recent labs as listed on admission templates   Results from last 7 days   Lab Units 10/07/22  0504 10/06/22  1535   WBC Thousand/uL 5 81 6 66   HEMOGLOBIN g/dL 11 2* 12 3   HEMATOCRIT % 34 0* 36 7   PLATELETS Thousands/uL 215 250   NEUTROS PCT %  --  56   LYMPHS PCT %  --  36   MONOS PCT %  --  6   EOS PCT %  --  1      Results from last 7 days   Lab Units 10/07/22  0504   SODIUM mmol/L 134*   POTASSIUM mmol/L 3 8   CHLORIDE mmol/L 105   CO2 mmol/L 26   BUN mg/dL 12   CREATININE mg/dL 0 72   ANION GAP mmol/L 3*   CALCIUM mg/dL 7 8*   ALBUMIN g/dL 3 4*   TOTAL BILIRUBIN mg/dL 0 55   ALK PHOS U/L 33*   ALT U/L 17   AST U/L 26   GLUCOSE RANDOM mg/dL 76          * I Have Reviewed All Lab Data Listed Above  * Additional Pertinent Lab Tests Reviewed: All Labs Within Last 24 Hours Reviewed      Imaging Studies: I have personally reviewed pertinent reports  Recent Cultures (last 7 days):           Today, Patient Was Seen By: Devon Morrissey PA-C    ** Please Note: Dictation voice to text software may have been used in the creation of this document  **  Attestation signed by Ifeanyi Hancock MD at 10/7/2022  1:32 PM:  Patient was evaluated and treated by the Advanced Practitioner  I was immediately available for questions and discussions regarding patient care  Signature of this chart is performed as administrative requirement

## 2022-10-07 LAB
ALBUMIN SERPL BCP-MCNC: 3.4 G/DL (ref 3.5–5)
ALP SERPL-CCNC: 33 U/L (ref 43–122)
ALT SERPL W P-5'-P-CCNC: 17 U/L
ANION GAP SERPL CALCULATED.3IONS-SCNC: 3 MMOL/L (ref 5–14)
AST SERPL W P-5'-P-CCNC: 26 U/L (ref 14–36)
ATRIAL RATE: 67 BPM
BILIRUB SERPL-MCNC: 0.55 MG/DL (ref 0.2–1)
BUN SERPL-MCNC: 12 MG/DL (ref 5–25)
CALCIUM ALBUM COR SERPL-MCNC: 8.3 MG/DL (ref 8.3–10.1)
CALCIUM SERPL-MCNC: 7.8 MG/DL (ref 8.4–10.2)
CHLORIDE SERPL-SCNC: 105 MMOL/L (ref 96–108)
CO2 SERPL-SCNC: 26 MMOL/L (ref 21–32)
CREAT SERPL-MCNC: 0.72 MG/DL (ref 0.6–1.2)
ERYTHROCYTE [DISTWIDTH] IN BLOOD BY AUTOMATED COUNT: 14.1 % (ref 11.6–15.1)
GFR SERPL CREATININE-BSD FRML MDRD: 110 ML/MIN/1.73SQ M
GLUCOSE SERPL-MCNC: 76 MG/DL (ref 70–99)
HCT VFR BLD AUTO: 34 % (ref 34.8–46.1)
HGB BLD-MCNC: 11.2 G/DL (ref 11.5–15.4)
MAGNESIUM SERPL-MCNC: 1.8 MG/DL (ref 1.6–2.3)
MCH RBC QN AUTO: 30.4 PG (ref 26.8–34.3)
MCHC RBC AUTO-ENTMCNC: 32.9 G/DL (ref 31.4–37.4)
MCV RBC AUTO: 92 FL (ref 82–98)
P AXIS: 31 DEGREES
PLATELET # BLD AUTO: 215 THOUSANDS/UL (ref 149–390)
PMV BLD AUTO: 10.3 FL (ref 8.9–12.7)
POTASSIUM SERPL-SCNC: 3.8 MMOL/L (ref 3.5–5.3)
PR INTERVAL: 122 MS
PROT SERPL-MCNC: 6.3 G/DL (ref 6.4–8.4)
QRS AXIS: 66 DEGREES
QRSD INTERVAL: 92 MS
QT INTERVAL: 396 MS
QTC INTERVAL: 418 MS
RBC # BLD AUTO: 3.69 MILLION/UL (ref 3.81–5.12)
SODIUM SERPL-SCNC: 134 MMOL/L (ref 135–147)
T WAVE AXIS: 37 DEGREES
VENTRICULAR RATE: 67 BPM
WBC # BLD AUTO: 5.81 THOUSAND/UL (ref 4.31–10.16)

## 2022-10-07 PROCEDURE — NC001 PR NO CHARGE: Performed by: PHYSICIAN ASSISTANT

## 2022-10-07 PROCEDURE — 83735 ASSAY OF MAGNESIUM: CPT | Performed by: PHYSICIAN ASSISTANT

## 2022-10-07 PROCEDURE — 85027 COMPLETE CBC AUTOMATED: CPT | Performed by: EMERGENCY MEDICINE

## 2022-10-07 PROCEDURE — 93010 ELECTROCARDIOGRAM REPORT: CPT | Performed by: INTERNAL MEDICINE

## 2022-10-07 PROCEDURE — 99232 SBSQ HOSP IP/OBS MODERATE 35: CPT | Performed by: PHYSICIAN ASSISTANT

## 2022-10-07 PROCEDURE — 80053 COMPREHEN METABOLIC PANEL: CPT | Performed by: EMERGENCY MEDICINE

## 2022-10-07 RX ORDER — HYDROXYZINE 50 MG/1
50 TABLET, FILM COATED ORAL EVERY 6 HOURS PRN
Status: DISCONTINUED | OUTPATIENT
Start: 2022-10-07 | End: 2022-10-08 | Stop reason: HOSPADM

## 2022-10-07 RX ORDER — LANOLIN ALCOHOL/MO/W.PET/CERES
3 CREAM (GRAM) TOPICAL
Status: DISCONTINUED | OUTPATIENT
Start: 2022-10-07 | End: 2022-10-08 | Stop reason: HOSPADM

## 2022-10-07 RX ADMIN — CITALOPRAM HYDROBROMIDE 40 MG: 20 TABLET ORAL at 08:21

## 2022-10-07 RX ADMIN — MELATONIN TAB 3 MG 3 MG: 3 TAB at 21:03

## 2022-10-07 RX ADMIN — HYDROXYZINE HYDROCHLORIDE 50 MG: 50 TABLET, FILM COATED ORAL at 20:46

## 2022-10-07 RX ADMIN — NALTREXONE HYDROCHLORIDE 50 MG: 50 TABLET, FILM COATED ORAL at 08:21

## 2022-10-07 RX ADMIN — FOLIC ACID 1 MG: 1 TABLET ORAL at 08:21

## 2022-10-07 RX ADMIN — MULTIPLE VITAMINS W/ MINERALS TAB 1 TABLET: TAB ORAL at 08:21

## 2022-10-07 RX ADMIN — SODIUM CHLORIDE 125 ML/HR: 0.9 INJECTION, SOLUTION INTRAVENOUS at 06:02

## 2022-10-07 RX ADMIN — THIAMINE HCL TAB 100 MG 100 MG: 100 TAB at 08:21

## 2022-10-07 NOTE — UTILIZATION REVIEW
Inpatient Admission Authorization Request   NOTIFICATION OF INPATIENT ADMISSION/INPATIENT AUTHORIZATION REQUEST   SERVICING FACILITY:   [place] [address]  Jasmin Charles  , [facility], [address] [place] [address]  Tax ID: [id number]  NPI: [id number]  [facility]: [facility]patient [facility]  [address]  Place of Service Code: 24     ATTENDING PROVIDER:  Attending Name and NPI#: Luis May Md [[id number]]  Address: Jasmin Charles [address] [place] [address], [address] [place] [address]  Phone: [phone]     UTILIZATION REVIEW CONTACT:  Nigel Hatchet, Utilization   Network Utilization Review Department  Phone: [phone]  Fax [phone]  Email: [email or web address]     PHYSICIAN ADVISORY SERVICES:  FOR [id number] REVIEW - MEDICAL NECESSITY DENIAL  Phone: [phone]  Fax: [phone]  Email: [email or web address]     TYPE OF REQUEST:  Inpatient Status     ADMISSION INFORMATION:  ADMISSION DATE/TIME: 10/6/22  3:06 PM  PATIENT DIAGNOSIS CODE/DESCRIPTION:  Alcohol use disorder, severe, dependence ([facility] ) [F10 20]  DISCHARGE DATE/TIME: No discharge date for patient encounter  IMPORTANT INFORMATION:  Please contact Nigel Hatchet directly with any questions or concerns regarding this request  Department voicemails are confidential     Send requests for admission clinical reviews, concurrent reviews, approvals, and administrative denials due to lack of clinical to fax [phone]

## 2022-10-07 NOTE — PROGRESS NOTES
10/07/22 1343   Referral Data   Referral Source Patient   Referral Name Self   Referral Reason Drug/Alcohol [place] [address]   Readmission Root Cause   30 Day Readmission No   Patient Information   Mental Status Alert   Primary Caregiver Self   Accompanied by/Relationship Self   Support System Immediate family;Friends   Legal Information   Legal Issues Pt denies   Activities of Daily Living Prior to Admission   Functional Status Independent   Assistive Device No device needed   Living Arrangement House;Lives with someone   Ambulation Independent   Access to Firearms   Access to Firearms No  (pt deniesq)   Income Information   Income Source Employed   Means of Transportation   Means of Transport to Appts: Drives Self     Pt's name, , address, phone number were verified by case management  Pt was informed of case management role and the purpose of completion of intake with case management  Pt was pleasant and cooperative  Pt reports that she drinks approximately 1/2 fifth of vodka per day in addition to some beers; pt reports first use at age 14 and last use on 10/5/2022 of 4 drinks  Pt reports longest period of sobriety was 3-4 days  Pt denies history of inpatient rehab; reports current engagement at [facility]  Pt signed AJIT for SHARE program  Pt denies current withdrawal symptoms; history of symptoms include anxiety, tremors, sweats, and insomnia  Pt denies history of withdrawal seizures/dt's  Pt denies family history of addiction  AUDIT: No score   PAWSS: 5  UDS (+) for: Benzodiazepine   Ethanol level in ED: < 10     Pt reports history of MDD/DUY; pt is currently engaged with outpatient therapy at [facility] and pt's PCP prescribes psychiatric medication  Pt denies previous inpatient behavioral health admissions  Pt denies SI/HI; denies AH/VH; denies history of abuse; denies access to firearms  Pt verified PCP as Dr Maggie Ross; signed AJIT   CM contacted pt's PCP and scheduled follow up appointment  Pt verified insurance as [address]; signed AJIT  Pt verified preferred pharmacy as [facility] in [place] on [address]  Pt denies any legal issues  Pt reports she completed Bachelors of nursing and is currently working on her Masters  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  Pt reports that she has her license; drives herself  Pt denies  service  Pt reports that she currently lives with her two children; reports that her children are with her parents at this time  Pt and CM completed relapse prevention plan  Pt and CM signed plan and pt received a copy of this plan  Pt struggled to identify coping skills to help manage triggers and prevent relapse; pt would benefit from continued treatment to help build and develop coping skills  Pt will continue with [facility] program for therapy; next appointment is on 10/20  Pt indicates desire to have naltrexone prescribed by PCP in order to avoid multiple copays that contribute to financial stressors  CM contacted PCP's office to verify that they prescribe naltrexone; pt has follow up appointment scheduled for 10/12/2022  Pt presents in the contemplation stage of change

## 2022-10-07 NOTE — DISCHARGE INSTR - OTHER ORDERS
Drug and Alcohol Resources in [facility]    If you have health insurance, including medical assistance, there should be a phone number on your insurance card that you can call to find out how to access services  The card may say, “For [address] or “For Drug and Alcohol Services” or “For Substance Abuse Services” call the number provided  Or contact 7-741-954-RJBP    [facility] Opioid Use Disorder (DANIELE)  The [facility] provides care management for adults with Opioid Use Disorder  The [facility] has a team of care managers who will help individuals get into treatment, coordinate behavioral and physical health care, and provide recovery support and guidance  Care managers will also provide information about Medication-Assisted Treatment  Contact [phone]    [facility] Drug and Alcohol Administration [phone] For additional information, contact the Department of Human Services Information and Referral Unit at [phone] [phone] between the hours of 8:30 a m  and 4:30 p m , Monday through Friday  Recovery Centers  These centers offer a safe, sober environment to those in recovery  A variety of programming including 12-Step Meetings, [place], Life Skills Workshops, etc  is offered at each location  Recovery Centers  These centers offer a safe, sober environment to those in recovery  A variety of programming including 12-Step Meetings, [place], Life Skills Workshops, etc  is offered at each location  [address] [facility], [address] [facility]  [phone]  [email or web address]  [email or web address]  org [address] on Main  Ashkan Osullivan, [address] [facility]  [phone]  ygexip-so-gwte    [address], [address] [facility]  [phone]   [address] [place] [address]  [phone]  www  [facility]renayFrank[facility], [address] [place] [address]  [phone]  [facility]  ROLAND HESS [facility] is [phone] [facility], [address], [place]  Chris Rowe, Thursday from 1pm-5pm and Friday, Saturday from 11am-3pm    [employer]  Confidential free help, from public health agencies, to find substance use treatment and information  [phone]    Link for Zoom Codes for Virtual 12 step Meetings [employer] tn  aspx    AA [facility]  If you feel you have a problem with alcohol, or if you simply want to know more about AA, call our 24-hour hotline at: [address]: [phone]    [address] Meetings can be found at http://www agudelo-wood biz/  NA Meeting list https://[email or web address]    [facility] mental health resources      Crisis Intervention  24 hour Emergency [address] [employer]  Call [phone]) [phone]  Outside of [facility]: call 1--256-767-UETF ([phone]) [address] mental health: Information & Referral at [phone]  [facility]  [address], [address], [address] [facility] [phone] drop in center  [address] [place] [address] [place] [facility], [address] [place] [address]  [phone]

## 2022-10-07 NOTE — UTILIZATION REVIEW
Initial Clinical Review    Admission: Date/Time/Statement:   Admission Orders (From admission, onward)     Ordered        10/06/22 1516  Inpatient Admission  Once                      Orders Placed This Encounter   Procedures   • Inpatient Admission     Standing Status:   Standing     Number of Occurrences:   1     Order Specific Question:   Level of Care     Answer:   Med Surg [16]     Order Specific Question:   Estimated length of stay     Answer:   More than 2 Midnights     Order Specific Question:   Certification     Answer:   I certify that inpatient services are medically necessary for this patient for a duration of greater than two midnights  See H&P and MD Progress Notes for additional information about the patient's course of treatment  Initial Presentation: 34 y o  female who presents with request for detox from ethanol  She has been consuming alcohol problematically for ten years but has increased her intake during the last year to include drinking most days early in the day  Sleep has been continuously disrupted  Patient consumes ethanol daily with history of worsening withdrawal, increasing use and recent failed outpatient diazepam treatment  Admit Inpatient med surg telemetry d/t ETOH WD:  Continue MVT, folate and thiamine, start naltrexone during admission then vivitrol prior to dc, SEWS  Macrocytic anemia noted on labs, associated w/ ETOH use, recheck labs  Consult CM for dispo planning  Date: 10/7   Day 2:   Reports weak appetite, otherwise denies acute complaints  Reports that she will likely continue with naltrexone PO at this time  Interested in outpatient resources upon discharge  Received 714 8 mg phenobarbital total thus far, administer additional phenobarbital prn, consult CM for aftercare planning  Naltrexone started yesterday, tolerating well, pt prefers to continue at this time  Encourage ETOH cessation, recommend OP fu w/ pcp and psychiatry       Triage Vitals   Temperature Pulse Respirations Blood Pressure SpO2   10/06/22 1515 10/06/22 1515 10/06/22 1515 10/06/22 1515 10/06/22 1513   98 7 °F (37 1 °C) 100 18 157/92 98 %      Temp Source Heart Rate Source Patient Position - Orthostatic VS BP Location FiO2 (%)   10/06/22 1515 10/06/22 1630 10/06/22 1515 10/06/22 1515 --   Temporal Monitor Sitting Left arm       Pain Score       10/06/22 1545       No Pain          Wt Readings from Last 1 Encounters:   10/06/22 54 9 kg (121 lb)     Additional Vital Signs:   Date/Time Temp Pulse Resp BP MAP (mmHg) SpO2 O2 Device Patient Position - Orthostatic VS   10/07/22 0724 97 6 °F (36 4 °C) 78 18 128/80 87 97 % None (Room air) Lying   10/07/22 0300 98 8 °F (37 1 °C) 77 18 132/73 -- 96 % None (Room air) Lying   10/06/22 1900 98 9 °F (37 2 °C) 80 18 123/70 87 95 % None (Room air) Lying   10/06/22 1729 -- 91 -- -- -- -- -- --   10/06/22 1630 -- 80 16 140/80 -- 99 % None (Room air) Lying   10/06/22 1515 98 7 °F (37 1 °C) 100 18 157/92 113 98 % None (Room air) Sitting   10/06/22 1513 -- -- -- -- -- 98 % None (Room air) --     [place] Name 10/07/22 0730 10/07/22 0300 10/06/22 2300 10/06/22 2200 10/06/22 2100   Severity of Ethanol Withdrawal Scale (SEWS)   ANXIETY: Do you feel that something bad is about to happen to you right now? 0  -LL 0  -NR 0  -NR 0  -NR 0  -NR   NAUSEA and DRY HEAVES or VOMITING? 0  -LL 0  -NR 0  -NR 0  -NR 0  -NR   SWEATING: (includes moist palms, sweating now)? Score 0 or 2 0  -LL 0  -NR 0  -NR 0  -NR 0  -NR   TREMOR: with arms extended eyes closed? 0  -LL 0  -NR 0  -NR 0  -NR 0  -NR   AGITATION: Fidgety, restless, pacing?  0  -LL 0  -NR 0  -NR 0  -NR 0  -NR   DISORIENTATION: 0  -LL 0  -NR 0  -NR 0  -NR 0  -NR   HALLUCINATIONS: 0  -LL 0  -NR 0  -NR 0  -NR 0  -NR   VITAL SIGNS: ANY (Pulse >342, Diastolic BP >55, Temp >91 6) 0  -LL 0  -NR 0  -NR 0  -NR 0  -NR   SEWS Total Score 0  -LL 0  -NR 0  -NR 0  -NR 0  -NR   Ybarra Agitation Sedation Scale (RASS)   Ybarra Agitation Sedation Scale (RASS) 0  -LL -- -- -- --   Row Name 10/06/22 2000 10/06/22 1729 10/06/22 1630 10/06/22 1549         Severity of Ethanol Withdrawal Scale (SEWS)        ANXIETY: Do you feel that something bad is about to happen to you right now? 3  -NR 0  -LL 0  -LL 3  -LL         NAUSEA and DRY HEAVES or VOMITING? 0  -NR 0  -LL 0  -LL 0  -LL         SWEATING: (includes moist palms, sweating now)? Score 0 or 2 0  -NR 0  -LL 0  -LL 2  -LL         TREMOR: with arms extended eyes closed? 0  -NR 0  -LL 0  -LL 2  -LL         AGITATION: Fidgety, restless, pacing?  0  -NR 0  -LL 0  -LL 3  -LL         DISORIENTATION: 0  -NR 0  -LL 0  -LL 0  -LL         HALLUCINATIONS: 0  -NR 0  -LL 0  -LL 0  -LL         VITAL SIGNS: ANY (Pulse >984, Diastolic BP >31, Temp >86 4) 0  -NR 0  -LL 0  -LL 3  -LL         SEWS Total Score 3  -NR 0  -LL 0  -LL 13  -LL                      Ybarra Agitation Sedation Scale (RASS)        Ybarra Agitation Sedation Scale (RASS) -- -1  -LL -1  -LL --             Pertinent Labs/Diagnostic Test Results:   10/6 EKG:  NSR      Results from last 7 days   Lab Units 10/07/22  0504 10/06/22  1535   WBC Thousand/uL 5 81 6 66   HEMOGLOBIN g/dL 11 2* 12 3   HEMATOCRIT % 34 0* 36 7   PLATELETS Thousands/uL 215 250   NEUTROS ABS Thousands/µL  --  3 79         Results from last 7 days   Lab Units 10/07/22  0504 10/06/22  2039 10/06/22  1651 10/06/22  1535   SODIUM mmol/L 134* 137 138 141   POTASSIUM mmol/L 3 8 4 0 4 7 2 6*   CHLORIDE mmol/L 105 105 105 118*   CO2 mmol/L 26 25 27 19*   ANION GAP mmol/L 3* 7 6 4*   BUN mg/dL 12 10 10 6   CREATININE mg/dL 0 72 0 65 0 58* 0 39*   EGFR ml/min/1 73sq m 110 117 121 138   CALCIUM mg/dL 7 8* 8 4 8 6 5 9*   CALCIUM, IONIZED mmol/L  --   --  1 13  --    MAGNESIUM mg/dL 1 8  --  1 9  --      Results from last 7 days   Lab Units 10/07/22  0504 10/06/22  1535   AST U/L 26 21   ALT U/L 17 15   ALK PHOS U/L 33* 27*   TOTAL PROTEIN g/dL 6 3* 5 5*   ALBUMIN g/dL 3 4* 2 9*   TOTAL BILIRUBIN mg/dL 0 55 0 14*     Results from last 7 days   Lab Units 10/07/22  0504 10/06/22  2039 10/06/22  1651 10/06/22  1535   GLUCOSE RANDOM mg/dL 76 89 88 62*       Results from last 7 days   Lab Units 10/06/22  1541   AMPH/METH  Negative   BARBITURATE UR  Negative   BENZODIAZEPINE UR  Positive*   COCAINE UR  Negative   METHADONE URINE  Negative   OPIATE UR  Negative   PCP UR  Negative   THC UR  Negative     Results from last 7 days   Lab Units 10/06/22  2242   ETHANOL LVL mg/dL <10       Past Medical History:   Diagnosis Date   • ADHD (attention deficit hyperactivity disorder)    • Anxiety    • Chickenpox    • Depression    • Eczema    • Herpes genitalis    • Varicella      Present on Admission:  • Alcohol withdrawal syndrome with complication (HCC)  • Anxiety  • Alcohol use disorder, severe, dependence ([facility])      Ad[facility] Diagnosis: [facility] [F10 20]  Age/Sex: 34 y o  female  Admission Orders:  Scheduled Medications:  citalopram, 40 mg, Oral, Daily  enoxaparin, 40 mg, Subcutaneous, Daily  folic acid, 1 mg, Oral, Daily  multivitamin-minerals, 1 tablet, Oral, Daily  naltrexone, 50 mg, Oral, Daily  thiamine, 100 mg, Oral, Daily      Continuous IV Infusions:     sodium chloride 0 9 % infusion  Rate: 125 mL/hr Dose: 125 mL/hr  Freq: Continuous Route: IV  Last Dose: Stopped (10/07/22 1004)  Start: 10/06/22 1530 End: 10/07/22 0955    PRN Meds:  acetaminophen, 650 mg, Oral, Q6H PRN  aluminum-magnesium hydroxide-simethicone, 30 mL, Oral, Q6H PRN  ondansetron, 4 mg, Intravenous, Q6H PRN      scd    IP CONSULT TO CASE MANAGEMENT    Network Utilization Review Department  ATTENTION: Please call with any questions or concerns to [phone] and carefully listen to the prompts so that you are directed to the right person   All voicemails are confidential   Bharath Delude all requests for admission clinical reviews, approved or denied determinations and any other requests to dedicated fax number below belonging to the campus where the patient is receiving treatment   List of dedicated fax numbers for the Facilities:  [address] [place] [address] DENIALS (Administrative/Medical Necessity) [phone]   [address]  (Maternity/NICU/Pediatrics) [phone]   [address] [phone]   [address] [phone]   [address] [facility] [address] [place] [address] [address] [facility] [phone]   [address] [facility] [address] [facility] [phone]

## 2022-10-07 NOTE — PROGRESS NOTES
Nila Mcnair  Progress Note - [address] 1990, 34 y o  female MRN: [id number]  Unit/Bed#: 5T DETOX 261-87 Encounter: [id number]  Primary Care Provider: Katlin Guthrie MD   Date and time admitted to hospital: 10/6/2022  3:06 PM    [address] [facility], LEVEL 4  Department of Medical Toxicology  Reason for Admission/Principal Problem: alcohol withdrawal   Rounding Provider: Ronal Lema PA-C, Darius Naranjo MD     * Alcohol withdrawal syndrome with complication [facility])  Assessment & Plan  · Patient consumes ethanol daily with history of worsening withdrawal, increasing use   · recently failed outpatient diazepam treatment  · SEWS protocol with symptom-triggered phenobarbital followed for medically-assisted alcohol withdrawal  · Received 714 8 mg phenobarbital total thus far, last dose administered 10/6/2022 2018  · Currently appears stable- no tremors, VSS  · Continue to monitor vitals, symptoms and administer additional phenobarbital as indicated      Alcohol use disorder, severe, dependence (HCC)  Assessment & Plan  · H/o chronic alcohol use  · Continue daily thiamine/folic acid/ multivitamin  · Naltrexone started yesterday- tolerating well, prefers to continue with pill at this time (not interested in Vivitrol injection currently)  · Consult case management for assistance with aftercare planning    Macrocytic anemia  Assessment & Plan  · Stable, no evidence of acute bleeding   · Likely 2/2 myelosuppression from chronic alcohol use  · Continue daily thiamine/folic acid  · Continue to monitor CBC  · Encourage alcohol cessation     Anxiety  Assessment & Plan  · Continue home citalopram 40mg daily     · Recommend outpatient f/u PCP/psychiatry     VTE Pharmacologic Prophylaxis:   Pharmacologic: Enoxaparin (Lovenox)- refused   Mechanical VTE Prophylaxis in Place: yes    Code Status: Level 1 - Full Code    Patient Centered Rounds: I have performed bedside rounds with nursing staff today  Discussions with Specialists or Other Care Team Provider: Dr Minor Sparks, [facility]   Education and Discussions with Family / Patient: I personally did not discuss patient with family at this time  Discussed current plan with patient, answered all questions to best of my ability  Time Spent for Care: 20 minutes  More than 50% of total time spent on counseling and coordination of care as described above  Current Length of Stay: 1 day(s)    Current Patient Status: Inpatient     Certification Statement: The patient will continue to require additional inpatient hospital stay due to ongoing monitoring/maintenance of alcohol withdrawal  Discharge Plan: home once medically stable- anticipate in next 24-48 hrs       Total time spent today 20 minutes  Greater than 50% of total time was spent with the patient and / or family counseling and / or coordination of care  A description of the counseling / coordination of care: alcohol use, withdrawal, naltrexone     Subjective:   Patient seen and examined bedside this morning  Reports weak appetite, otherwise denies acute complaints  Currently denies headaches, lightheadedness/dizziness, coughing/sneezing/congestion/rhinorrhea, chest pain, SOB/dyspnea, abdominal pain, N/V/C, dysuria/hematuria, hallucinations  Reports that she will likely continue with naltrexone PO at this time  Interested in outpatient resources upon discharge       Objective:     Clinical Opiate Withdrawal Scale  Pulse: 62    SEWS Total Score: 0 (10/7/2022  7:30 AM)    Last 24 Hours Medication List:   Current Facility-Administered Medications   Medication Dose Route Frequency Provider Last Rate   • acetaminophen  650 mg Oral Q6H PRN Eddie Lagunas DO     • aluminum-magnesium hydroxide-simethicone  30 mL Oral Q6H PRN Eddie Lagunas DO     • citalopram  40 mg Oral Daily Eddie Lagunas DO     • enoxaparin  40 mg Subcutaneous Daily Eddie Lagunas DO     • folic acid  1 mg Oral Daily Hung Sneddon Nappe, DO     • multivitamin-minerals  1 tablet Oral Daily Hung Sneddon Nappe, DO     • naltrexone  50 mg Oral Daily Hung Sneddon Nappe, DO     • ondansetron  4 mg Intravenous Q6H PRN Hung Sneddon Nappe, DO     • thiamine  100 mg Oral Daily Hung Sneddon Nappe, DO           Vitals:   Temp (24hrs), Av 5 °F (36 9 °C), Min:97 6 °F (36 4 °C), Max:98 9 °F (37 2 °C)    Temp:  [97 6 °F (36 4 °C)-98 9 °F (37 2 °C)] 98 7 °F (37 1 °C)  HR:  [] 62  Resp:  [16-18] 18  BP: (123-157)/(70-92) 127/71  SpO2:  [95 %-99 %] 96 %  Body mass index is 22 13 kg/m²  Input and Output Summary (last 24 hours): Intake/Output Summary (Last 24 hours) at 10/7/2022 1233  Last data filed at 10/7/2022 1004  Gross per 24 hour   Intake 1380 ml   Output --   Net 1380 ml       Physical Exam:   Physical Exam  Vitals and nursing note reviewed  Constitutional:       General: She is not in acute distress  Appearance: Normal appearance  She is well-developed and normal weight  She is not ill-appearing or diaphoretic  HENT:      Head: Normocephalic and atraumatic  Eyes:      General: No scleral icterus  Extraocular Movements:      Right eye: No nystagmus  Left eye: No nystagmus  Conjunctiva/sclera: Conjunctivae normal       Pupils: Pupils are equal, round, and reactive to light  Cardiovascular:      Rate and Rhythm: Normal rate and regular rhythm  Pulses: Normal pulses  Heart sounds: Normal heart sounds  No friction rub  No gallop  Pulmonary:      Effort: Pulmonary effort is normal  No respiratory distress  Breath sounds: Normal breath sounds  No wheezing, rhonchi or rales  Abdominal:      General: Abdomen is flat  Bowel sounds are normal  There is no distension  Palpations: Abdomen is soft  Tenderness: There is no abdominal tenderness  There is no guarding  Musculoskeletal:      Cervical back: Neck supple  Skin:     General: Skin is warm and dry  Neurological:      General: No focal deficit present  Mental Status: She is alert and oriented to person, place, and time  Mental status is at baseline  Motor: No tremor  Coordination: Finger-Nose-Finger Test normal    Psychiatric:         Attention and Perception: Attention normal          Mood and Affect: Mood is anxious  Speech: Speech normal          Behavior: Behavior is cooperative  Additional Data:     Labs: keep all most recent labs as listed on admission templates   Results from last 7 days   Lab Units 10/07/22  0504 10/06/22  1535   WBC Thousand/uL 5 81 6 66   HEMOGLOBIN g/dL 11 2* 12 3   HEMATOCRIT % 34 0* 36 7   PLATELETS Thousands/uL 215 250   NEUTROS PCT %  --  56   LYMPHS PCT %  --  36   MONOS PCT %  --  6   EOS PCT %  --  1      Results from last 7 days   Lab Units 10/07/22  0504   SODIUM mmol/L 134*   POTASSIUM mmol/L 3 8   CHLORIDE mmol/L 105   CO2 mmol/L 26   BUN mg/dL 12   CREATININE mg/dL 0 72   ANION GAP mmol/L 3*   CALCIUM mg/dL 7 8*   ALBUMIN g/dL 3 4*   TOTAL BILIRUBIN mg/dL 0 55   ALK PHOS U/L 33*   ALT U/L 17   AST U/L 26   GLUCOSE RANDOM mg/dL 76          * I Have Reviewed All Lab Data Listed Above  * Additional Pertinent Lab Tests Reviewed: All Labs Within Last 24 Hours Reviewed      Imaging Studies: I have personally reviewed pertinent reports  Recent Cultures (last 7 days): Today, Patient Was Seen By: [facility] Yuni LEE    ** Please Note: Dictation voice to text software may have been used in the creation of this document   **

## 2022-10-08 VITALS
RESPIRATION RATE: 16 BRPM | BODY MASS INDEX: 22.26 KG/M2 | WEIGHT: 121 LBS | HEART RATE: 68 BPM | OXYGEN SATURATION: 97 % | DIASTOLIC BLOOD PRESSURE: 66 MMHG | SYSTOLIC BLOOD PRESSURE: 112 MMHG | TEMPERATURE: 99.4 F | HEIGHT: 62 IN

## 2022-10-08 LAB
ANION GAP SERPL CALCULATED.3IONS-SCNC: 4 MMOL/L (ref 5–14)
ATRIAL RATE: 67 BPM
BUN SERPL-MCNC: 11 MG/DL (ref 5–25)
CALCIUM SERPL-MCNC: 8.2 MG/DL (ref 8.4–10.2)
CHLORIDE SERPL-SCNC: 105 MMOL/L (ref 96–108)
CO2 SERPL-SCNC: 25 MMOL/L (ref 21–32)
CREAT SERPL-MCNC: 0.65 MG/DL (ref 0.6–1.2)
ERYTHROCYTE [DISTWIDTH] IN BLOOD BY AUTOMATED COUNT: 13.9 % (ref 11.6–15.1)
GFR SERPL CREATININE-BSD FRML MDRD: 117 ML/MIN/1.73SQ M
GLUCOSE SERPL-MCNC: 99 MG/DL (ref 70–99)
HCT VFR BLD AUTO: 34.4 % (ref 34.8–46.1)
HGB BLD-MCNC: 11.5 G/DL (ref 11.5–15.4)
MAGNESIUM SERPL-MCNC: 1.9 MG/DL (ref 1.6–2.3)
MCH RBC QN AUTO: 30.3 PG (ref 26.8–34.3)
MCHC RBC AUTO-ENTMCNC: 33.4 G/DL (ref 31.4–37.4)
MCV RBC AUTO: 91 FL (ref 82–98)
P AXIS: 31 DEGREES
PLATELET # BLD AUTO: 208 THOUSANDS/UL (ref 149–390)
PMV BLD AUTO: 10.5 FL (ref 8.9–12.7)
POTASSIUM SERPL-SCNC: 3.7 MMOL/L (ref 3.5–5.3)
PR INTERVAL: 122 MS
QRS AXIS: 66 DEGREES
QRSD INTERVAL: 92 MS
QT INTERVAL: 396 MS
QTC INTERVAL: 418 MS
RBC # BLD AUTO: 3.79 MILLION/UL (ref 3.81–5.12)
SODIUM SERPL-SCNC: 134 MMOL/L (ref 135–147)
T WAVE AXIS: 37 DEGREES
VENTRICULAR RATE: 67 BPM
WBC # BLD AUTO: 5.81 THOUSAND/UL (ref 4.31–10.16)

## 2022-10-08 PROCEDURE — 93010 ELECTROCARDIOGRAM REPORT: CPT | Performed by: INTERNAL MEDICINE

## 2022-10-08 PROCEDURE — 85027 COMPLETE CBC AUTOMATED: CPT | Performed by: PHYSICIAN ASSISTANT

## 2022-10-08 PROCEDURE — 99239 HOSP IP/OBS DSCHRG MGMT >30: CPT

## 2022-10-08 PROCEDURE — 80048 BASIC METABOLIC PNL TOTAL CA: CPT | Performed by: PHYSICIAN ASSISTANT

## 2022-10-08 PROCEDURE — 83735 ASSAY OF MAGNESIUM: CPT | Performed by: PHYSICIAN ASSISTANT

## 2022-10-08 RX ORDER — NALTREXONE HYDROCHLORIDE 50 MG/1
50 TABLET, FILM COATED ORAL DAILY
Qty: 14 TABLET | Refills: 0 | Status: SHIPPED | OUTPATIENT
Start: 2022-10-08 | End: 2022-10-12 | Stop reason: SDUPTHER

## 2022-10-08 RX ORDER — HYDROXYZINE 50 MG/1
50 TABLET, FILM COATED ORAL EVERY 6 HOURS PRN
Qty: 120 TABLET | Refills: 0 | Status: SHIPPED | OUTPATIENT
Start: 2022-10-08 | End: 2022-11-07

## 2022-10-08 RX ADMIN — MULTIPLE VITAMINS W/ MINERALS TAB 1 TABLET: TAB ORAL at 08:10

## 2022-10-08 RX ADMIN — THIAMINE HCL TAB 100 MG 100 MG: 100 TAB at 08:10

## 2022-10-08 RX ADMIN — CITALOPRAM HYDROBROMIDE 40 MG: 20 TABLET ORAL at 08:10

## 2022-10-08 RX ADMIN — NALTREXONE HYDROCHLORIDE 50 MG: 50 TABLET, FILM COATED ORAL at 08:10

## 2022-10-08 RX ADMIN — FOLIC ACID 1 MG: 1 TABLET ORAL at 08:10

## 2022-10-08 NOTE — ASSESSMENT & PLAN NOTE
· Patient consumes ethanol daily with history of worsening withdrawal, increasing use   · recently failed outpatient diazepam treatment    · Discontinue protocol with symptom-triggered phenobarbital followed for medically-assisted alcohol withdrawal   · Received 714 8 mg phenobarbital total thus far, last dose administered 10/6/2022 2018  · Currently appears stable- no tremors, VSS  · Continue to monitor vitals, symptoms and administer additional phenobarbital as indicated

## 2022-10-08 NOTE — DISCHARGE SUMMARY
MEDICAL DETOX UNIT, LEVEL 4  Department of Medical Toxicology  Reason for Admission/Principal Problem:  ETOH withdrawal, EtOH use disorder  Admitting provider: Alma Hollins MD   10/6/2022  3:06 PM       Discharging Physician / Practitioner: Paul Snyder  PCP: Reno Oliveira MD  Admission Date:   Admission Orders (From admission, onward)     Ordered        10/06/22 1516  Inpatient Admission  Once            10/06/22 1516  Inpatient Admission  Once                      Discharge Date: 10/08/22    Medical Problems             Resolved Problems  Date Reviewed: 9/26/2022   None                 * Alcohol withdrawal syndrome with complication [facility])  Assessment & Plan  · Patient consumes ethanol daily with history of worsening withdrawal, increasing use   · recently failed outpatient diazepam treatment  · Discontinue protocol with symptom-triggered phenobarbital followed for medically-assisted alcohol withdrawal   · Received 714 8 mg phenobarbital total thus far, last dose administered 10/6/2022 2018  · Currently appears stable- no tremors, VSS  · Continue to monitor vitals, symptoms and administer additional phenobarbital as indicated      Alcohol use disorder, severe, dependence (HCC)  Assessment & Plan  · H/o chronic alcohol use  · Continue daily thiamine/folic acid/ multivitamin  · Naltrexone started yesterday- tolerating well, prefers to continue with pill at this time (not interested in Vivitrol injection currently)  · Consult case management for assistance with aftercare planning    Macrocytic anemia  Assessment & Plan  · Stable, no evidence of acute bleeding   · Likely 2/2 myelosuppression from chronic alcohol use  · Continue daily thiamine/folic acid  · Continue to monitor CBC   · Encourage alcohol cessation     Anxiety  Assessment & Plan  · Continue home citalopram 40mg daily     · Continue atarax prn   · Recommend outpatient f/u PCP/psychiatry       Consultations During Hospital Stay:  · Case Management  · Certified     Procedures Performed:   · None    Significant Findings / Test Results:   · Macrocytic anemia:  Chronic and stable    Incidental Findings:   · None     Test Results Pending at Discharge (will require follow up): · None     Outpatient Tests Requested:  · None    Complications:  None    Reason for Admission:  ETOH withdrawal    Hospital Course:     Yoko Velasquez is a 34 y o  female patient who originally presented to the hospital on 10/6/2022 due to alcohol withdrawal  Patient initially presented to the [facility] office requesting detoxification from alcohol  Patient was admitted to the [facility] medical detox unit as a direct admission under [facility] protocol for medically assisted alcohol withdrawal and received a total of 715 mg phenobarbital without complication  Patient's alcohol withdrawal symptoms subsequently resolved, and she has remained without objective evidence of alcohol withdrawal at this time  Case management was consulted for assistance with disposition planning, and patient was discharged to home with outpatient resources and follow-up to [facility] program for ongoing management  Please see above list of diagnoses and related plan for additional information  Condition at Discharge: good     Discharge Day Visit / Exam:     Subjective:  Patient was seen and examined at bedside  Patient offered no acute overnight complaints  She reports relief of anxiety with medication and will continue upon discharge  Vitals: Blood Pressure: 112/66 (10/08/22 0717)  Pulse: 68 (10/08/22 0717)  Temperature: 99 4 °F (37 4 °C) (10/08/22 0717)  Temp Source: Temporal (10/08/22 0717)  Respirations: 16 (10/08/22 0717)  Height: 5' 2" (157 5 cm) (10/06/22 1515)  Weight - Scale: 54 9 kg (121 lb) (10/06/22 1515)  SpO2: 97 % (10/08/22 0717)  Exam:   Physical Exam  Constitutional:       Appearance: She is not ill-appearing or diaphoretic     Eyes:      General: No scleral icterus  Extraocular Movements:      Right eye: No nystagmus  Left eye: No nystagmus  Pupils: Pupils are equal, round, and reactive to light  Cardiovascular:      Rate and Rhythm: Normal rate and regular rhythm  Heart sounds: No murmur heard  Pulmonary:      Effort: No respiratory distress  Breath sounds: Normal breath sounds  No wheezing  Abdominal:      General: Bowel sounds are normal  There is no distension  Palpations: Abdomen is soft  Tenderness: There is no abdominal tenderness  Neurological:      Motor: No tremor  Psychiatric:         Mood and Affect: Mood is not anxious or depressed  Discussion with Family:  Patient    Discharge instructions/Information to patient and family:   See after visit summary for information provided to patient and family  Provisions for Follow-Up Care:  See after visit summary for information related to follow-up care and any pertinent home health orders  Disposition:     Home    For Discharges to [facility] SNF:   · Not Applicable to this Patient - Not Applicable to this Patient    Planned Readmission: NA     Discharge Statement:  I spent 35 minutes discharging the patient  This time was spent on the day of discharge  I had direct contact with the patient on the day of discharge  Greater than 50% of the total time was spent examining patient, answering all patient questions, arranging and discussing plan of care with patient as well as directly providing post-discharge instructions  Additional time then spent on discharge activities  Discharge Medications:  See after visit summary for reconciled discharge medications provided to patient and family        ** Please Note: This note has been constructed using a voice recognition system **

## 2022-10-08 NOTE — PLAN OF CARE
Problem: SUBSTANCE USE/ABUSE  Goal: By discharge, will develop insight into their chemical dependency and sustain motivation to continue in recovery  Description: INTERVENTIONS:  - Attends all daily group sessions and scheduled AA groups  - Actively practices coping skills through participation in the therapeutic community and adherence to program rules  - Reviews and completes assignments from individual treatment plan  - Assist patient development of understanding of their personal cycle of addiction and relapse triggers  Outcome: Completed  Goal: By discharge, patient will have ongoing treatment plan addressing chemical dependency  Description: INTERVENTIONS:  - Assist patient with resources and/or appointments for ongoing recovery based living  Outcome: Completed

## 2022-10-08 NOTE — CASE MANAGEMENT
Case Management Discharge Planning Note    Patient name Randi Patel  Location 5T DETOX 508/5T DETOX 50* MRN [id number]  : 1990 Date 10/8/2022       Current Admission Date: 10/6/2022  Current Admission Diagnosis:Alcohol withdrawal syndrome with complication [facility])   Patient Active Problem List    Diagnosis Date Noted   • Alcohol withdrawal syndrome with complication ([facility] )    • Macrocytic anemia 10/06/2022   • Alcohol use disorder, severe, dependence ([facility] ) 2022   • Acute upper respiratory infection 2022   • BRBPR (bright red blood per rectum) 2021   • Cervical strain 2021   • Heart palpitations 2018   • Anxiety 2017   • Other specified behavioral and emotional disorders with onset usually occurring in childhood and adolescence 2017      LOS (days): 2  Geometric Mean LOS (GMLOS) (days):   Days to GMLOS:     OBJECTIVE:  Risk of Unplanned Readmission Score: 7 44         Current admission status: Inpatient   Preferred Pharmacy:   [address] [place],[address]D, [facility] [place] [address] [facility] [address] [facility]  Phone: [phone] Fax: Cris Rodartema - [address] ,  [address] ,  [address] [place] [address]  Phone: [phone] Fax: [phone]    Primary Care Provider: Petros Donnelly MD    Primary Insurance: CAPITAL  Secondary Insurance:     DISCHARGE DETAILS: CM met with pt; reviewed outpatient follow up appointments  Pt will go to [facility] for outpatient therapy and continue MAT with her PCP, Dr Avtar Townsend  Pt requested prescriptions be sent to [facility]; CM relayed this to provider       Discharge planning discussed with[de-identified] Patient  Freedom of Choice: Yes                   Contacts  Patient Contacts: [address] [facility] [address]; PCP-Dr Avtar Townsend  Relationship to Patient[de-identified] Treatment Provider  Contact Method: Phone  Phone Number: [phone]  Reason/Outcome: Continuity of Care, Discharge Planning              Other Referral/Resources/Interventions Provided:  Referrals Provided[de-identified] Crisis Hotline, IOP, Peer Specialist, Support Group, Therapist (inpatient substance use treatment)    Would you like to participate in our [facility] [address] service program?  : No - Declined                                              Family notified[de-identified] Pt declined to sign ROIs for supportive contacts

## 2022-10-08 NOTE — ASSESSMENT & PLAN NOTE
· Continue home citalopram 40mg daily     · Continue atarax prn   · Recommend outpatient f/u PCP/psychiatry

## 2022-10-10 ENCOUNTER — TRANSITIONAL CARE MANAGEMENT (OUTPATIENT)
Dept: FAMILY MEDICINE CLINIC | Facility: CLINIC | Age: 32
End: 2022-10-10

## 2022-10-11 PROBLEM — J06.9 ACUTE UPPER RESPIRATORY INFECTION: Status: RESOLVED | Noted: 2022-04-12 | Resolved: 2022-10-11

## 2022-10-11 NOTE — UTILIZATION REVIEW
Notification of Discharge   This is a Notification of Discharge from our facility [address]  Please be advised that this patient has been discharge from our facility  Below you will find the admission and discharge date and time including the patient’s disposition  UTILIZATION REVIEW CONTACT:  Linda Salomon MA  Utilization   Network Utilization Review Department  Phone: [phone] x carefully listen to the prompts  All voicemails are confidential   Email: [email or web address]     PHYSICIAN ADVISORY SERVICES:  FOR CPHQ-AN-CFBU REVIEW - MEDICAL NECESSITY DENIAL  Phone: [phone]  Fax: [phone]  Email: [email or web address]     PRESENTATION DATE: 10/6/2022  3:06 PM  OBERVATION ADMISSION DATE:   INPATIENT ADMISSION DATE: 10/6/22  3:06 PM   DISCHARGE DATE: 10/8/2022  9:24 AM  DISPOSITION: Home/Self Care Home/Self Care      IMPORTANT INFORMATION:  Send all requests for admission clinical reviews, approved or denied determinations and any other requests to dedicated fax number below belonging to the campus where the patient is receiving treatment   List of dedicated fax numbers:  [address] [facility] [address] [facility] [address] DENIALS (Administrative/Medical Necessity) [phone]   [address] [place] [address] (Maternity/NICU/Pediatrics) [phone]   [facility] [phone]   [facility] [phone]   [address] [phone]   [address] [facility] [phone]   [address] [facility] [facility] [address] [facility] [address] [phone]   [address] [place] [address] 119 [phone]   [facility]  [phone]   [address] [facility] [phone]   [address] [facility] [address] [facility] [phone]

## 2022-10-12 ENCOUNTER — OFFICE VISIT (OUTPATIENT)
Dept: FAMILY MEDICINE CLINIC | Facility: CLINIC | Age: 32
End: 2022-10-12
Payer: COMMERCIAL

## 2022-10-12 VITALS
WEIGHT: 119.8 LBS | HEART RATE: 75 BPM | DIASTOLIC BLOOD PRESSURE: 80 MMHG | HEIGHT: 62 IN | SYSTOLIC BLOOD PRESSURE: 124 MMHG | OXYGEN SATURATION: 97 % | BODY MASS INDEX: 22.05 KG/M2 | TEMPERATURE: 97.6 F

## 2022-10-12 DIAGNOSIS — F41.9 ANXIETY: ICD-10-CM

## 2022-10-12 DIAGNOSIS — F10.20 ALCOHOL USE DISORDER, SEVERE, DEPENDENCE (HCC): ICD-10-CM

## 2022-10-12 DIAGNOSIS — R00.2 HEART PALPITATIONS: Primary | ICD-10-CM

## 2022-10-12 PROCEDURE — 99495 TRANSJ CARE MGMT MOD F2F 14D: CPT | Performed by: FAMILY MEDICINE

## 2022-10-12 RX ORDER — CITALOPRAM 40 MG/1
40 TABLET ORAL DAILY
Qty: 90 TABLET | Refills: 1 | Status: SHIPPED | OUTPATIENT
Start: 2022-10-12 | End: 2023-01-10

## 2022-10-12 RX ORDER — BUPROPION HYDROCHLORIDE 150 MG/1
150 TABLET ORAL EVERY MORNING
Qty: 30 TABLET | Refills: 1 | Status: SHIPPED | OUTPATIENT
Start: 2022-10-12

## 2022-10-12 RX ORDER — NALTREXONE HYDROCHLORIDE 50 MG/1
50 TABLET, FILM COATED ORAL DAILY
Qty: 90 TABLET | Refills: 1 | Status: SHIPPED | OUTPATIENT
Start: 2022-10-12 | End: 2022-10-26

## 2022-10-12 NOTE — PROGRESS NOTES
Assessment & Plan     1  Heart palpitations    2  Anxiety  -     citalopram (CeleXA) 40 mg tablet; Take 1 tablet (40 mg total) by mouth daily  -     buPROPion (Wellbutrin XL) 150 mg 24 hr tablet; Take 1 tablet (150 mg total) by mouth every morning    3  Alcohol use disorder, severe, dependence (HCC)  -     naltrexone (REVIA) 50 mg tablet; Take 1 tablet (50 mg total) by mouth daily for 15days    74-year-old woman with: Transitional care management visit for inpatient detox for alcohol use disorder with dependence anxiety along with palpitations will continue current medications discussed supportive care return parameters encouraged follow-up with counseling therapy and meetings discussed supportive care return parameters follow-up in 6 months     Subjective     Transitional Care Management Review:   Barbara Hurtado is a 34 y o  female here for TCM follow up  During the TCM phone call patient stated:  TCM Call     Date and time call was made  10/10/2022  9:01 AM    Patient was hospitialized at  [facility]    Date of Admission  10/06/22    Date of discharge  10/08/22    Diagnosis  ETOH WITH DRAWAL    Disposition  Home    Were the patients medications reviewed and updated  Yes    Current Symptoms  None      TCM Call     Post hospital issues  None    Should patient be enrolled in anticoag monitoring? No    Scheduled for follow up?   Yes    Did you obtain your prescribed medications  Yes    Do you need help managing your prescriptions or medications  No    Is transportation to your appointment needed  No    I have advised the patient to call PCP with any new or worsening symptoms  JEAN CANTOR CMA    Are you recieving any outpatient services  No    Are you recieving home care services  No    Are you using any community resources  No    Current waiver services  No    Have you fallen in the last 12 months  No    Interperter language line needed  No    Counseling  Patient        Patient is a 74-year-old woman who presents for transitional care management visit  She had a relapse on alcohol use disorder with dependence and she went for inpatient detox the setting of patient with anxiety and palpitations she admits she did very well she is doing well on the naltrexone she is off alcohol and benzodiazepine cleaned she is going to meetings she has follow-up planned with outpatient therapy she admits she is going doing well otherwise no fevers chills nausea vomiting no other complaints at this time    Review of Systems   Constitutional: Negative  HENT: Negative  Eyes: Negative  Respiratory: Negative  Cardiovascular: Negative  Gastrointestinal: Negative  Endocrine: Negative  Genitourinary: Negative  Musculoskeletal: Negative  Allergic/Immunologic: Negative  Neurological: Negative  Hematological: Negative  Psychiatric/Behavioral: Negative  All other systems reviewed and are negative  Objective     /80 (BP Location: Left arm, Patient Position: Sitting, Cuff Size: Standard)   Pulse 75   Temp 97 6 °F (36 4 °C) (Tympanic)   Ht 5' 2" (1 575 m)   Wt 54 3 kg (119 lb 12 8 oz)   SpO2 97%   BMI 21 91 kg/m²      Physical Exam  Constitutional:       General: She is not in acute distress  Appearance: She is well-developed  She is not diaphoretic  HENT:      Head: Normocephalic and atraumatic  Right Ear: External ear normal       Left Ear: External ear normal       Nose: Nose normal       Mouth/Throat:      Pharynx: No oropharyngeal exudate  Eyes:      General:         Right eye: No discharge  Left eye: No discharge  Conjunctiva/sclera: Conjunctivae normal       Pupils: Pupils are equal, round, and reactive to light  Neck:      Thyroid: No thyromegaly  Trachea: No tracheal deviation  Cardiovascular:      Rate and Rhythm: Normal rate and regular rhythm  Heart sounds: Normal heart sounds  No murmur heard  No friction rub  No gallop  Pulmonary:      Effort: Pulmonary effort is normal  No respiratory distress  Breath sounds: Normal breath sounds  Abdominal:      General: There is no distension  Palpations: Abdomen is soft  Tenderness: There is no abdominal tenderness  There is no guarding or rebound  Musculoskeletal:         General: Normal range of motion  Lymphadenopathy:      Cervical: No cervical adenopathy  Skin:     General: Skin is warm  Neurological:      Mental Status: She is alert and oriented to person, place, and time  Cranial Nerves: No cranial nerve deficit  Psychiatric:         Behavior: Behavior normal          Thought Content:  Thought content normal          Judgment: Judgment normal        Kaylah Willingham MD

## 2022-10-27 ENCOUNTER — PATIENT OUTREACH (OUTPATIENT)
Dept: FAMILY MEDICINE CLINIC | Facility: CLINIC | Age: 32
End: 2022-10-27

## 2022-10-27 NOTE — PROGRESS NOTES
RUBY NEWTON completed chart review at this time and determined pt did follow up with referral to [facility] and has began substance use treatment  RUBY NEWTON will close referral at this time, however, will be available if needed via new order

## 2022-11-01 ENCOUNTER — TELEPHONE (OUTPATIENT)
Dept: FAMILY MEDICINE CLINIC | Facility: CLINIC | Age: 32
End: 2022-11-01

## 2022-11-01 NOTE — TELEPHONE ENCOUNTER
----- Message from Ame Ricketts MA sent at 10/28/2022  9:25 AM EDT -----  Regarding: FW: Note for admission  How do you want me to write this note?     ----- Message -----  From: Leonel Grover MD  Sent: 10/27/2022   4:10 PM EDT  To: [facility]  Subject: FW: Note for admission                           Campos Rosas with me    ----- Message -----  From: Ame Ricketts MA  Sent: 10/27/2022  11:18 AM EDT  To: Leonel Grover MD  Subject: RE: Note for admission                           See note    ----- Message -----  From: Donald Patel  Sent: 10/27/2022  10:31 AM EDT  To: [facility]  Subject: Note for admission                               Hi Dr Hyacinth Hardy hoping you can write me a note to explain my positive drug screen for barbiturates stating that I received 417 8 phenobarbital during my hospital admission October 6-8  Thank you!   Nirmala Hensley

## 2022-11-01 NOTE — TELEPHONE ENCOUNTER
Please provide note saying that patient was recently treated in the hospital inpatient with phenobarbitol so she would have some barbiturates in her system

## 2022-11-04 NOTE — PROGRESS NOTES
Please formulate letter saying that patient was recently treated with phenobarbital and patient has resolved she may have phenobarbital in her system    She is fully off the medication in weaned and she may return to work full-time without limitations

## 2022-11-08 ENCOUNTER — SOCIAL WORK (OUTPATIENT)
Dept: PSYCHIATRY | Facility: CLINIC | Age: 32
End: 2022-11-08

## 2022-11-08 DIAGNOSIS — F10.20 ALCOHOL USE DISORDER, SEVERE, DEPENDENCE (HCC): ICD-10-CM

## 2022-11-08 DIAGNOSIS — F41.9 ANXIETY: Primary | ICD-10-CM

## 2022-11-08 NOTE — PSYCH
11/08/22  Start Time: 1401  Stop Time: 1445  Total Visit Time: 44 minutes  MAT Intake    Assessment    Presenting problem: I feel like it's always been an issue; within the past year or two it increased in frequency and amount  Individuals perception of the problem: I was self referred     Individual/family's expectations: For now, I would like to be in recovery       Current living situation: [place]     Issues with current living situation: No    Able to return?: No    Describe primary support system: My parents     Marital status: Single (never )    Family and relationships (please list relation, first and last name, sex, age)    Relation First and Last name Sex age                                   Academic and Vocational    Currently in school: Yes but in a current leave of absence     Education level: [address] name: [employer]     Academic performance: Straight A student     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, 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)    Employment status: Employed full time    Hours per week: 36 or more Day Shift    Issues with employment: na     service: No    Comments:     Cultural and Ethnic Background    Describe cultural or ethnic beliefs that may impact care: N/A     Symptoms Checklist    Depression:  Yes hopelessness and disturbed sleep    Alicia:  No    Anxiety:  Yes Anxious, Panic Attacks, Excessive Worry, Rumination    Eating Disorder:  Denies    Post Traumatic Stress Disorder  Denies    Obsessive/Compulsive  Denies    Thought process:  Denies    Impulsive behavior:  Denies     Development/Childhood History    Developmental /Childhood history  Within normal limits     Child's current age and completed milestones  Within normal limits     Childhood Medical History    Medical history  Within normal limits      Legal    Legal history  Not applicable      Sexual History    Sexual orientation  Heterosexual or straight    Gender identity  Female    Gender identity issues? No    Self-history of sexual or physical abuse  No    Treatment History    Past Psychiatric treatment  Substance abuse/detox facility    Current behavorial health care services  No    Substance Abuse History    Drug and Alcohol history  Yes Alcohol Age of onset: 14-15     Method of use: Most recently on average 10 drinks daily    Average use and frequency: beer and liquor  8% ABV IPA Vodka and club soda     When last used: 10/6/22     Last used amount: 2 strong IPA's     Longest period w/o using: 3-4 days prior to admission on 10/6 now presently a month without use     History of detoxification or rehabilitation? Yes, describe: [facility]     Family substance use history? No    History of addictive behavior? No    Any other comments about substance abuse? No    Mental Status Exam    Consciousness  Alert    Behavior  Cooperative    Dress  Clean     Motor  No abnormal movements were observed    Age  stated age    Speech- Rate  normal    Speech- Tone  normal    Speech- Volume  normal    Mood  Good    Affect  Congruent    Range  Normal    Orientation to person, place and time  oriented to person and place and time    Cognition    Memory  short term memory intact and short term memory impaired    Attention and concentration  intact    Thought form  Logical    Thought Content    Suicidal ideation  denies    Homicidal Ideation  denies    Self- injurious thoughts  denies    Delusional Content  None reported    Perception  Auditory Hallucinations  denies    Visual Hallucinations  denies    Tactile Hallucinations  denies    Cognitive function  At baseline    Insight  Good     Judgement  Good     Other pertinent findings  N/A    Risk Assessment    History of Violence?    Other No    Risk factor to self    No     Preoccupation with death    No    Risk Factors (to others)    Danger to others  No    Homicidal ideation  No    Past  No    Present  No    Threats  No    Current plan  No    Past  No    Present  No    History of attempts  No    Past  No    Present  No    Methods  No    Past  No    Present  No    Immediate access to firearms  No    Protective Factors    Family involvement  Yes, describe: just my parents are a support  Sense of hope  Yes, describe: Yeah    Strong social support  No    Other  No    History of Trauma    Have you experienced trauma in your life? No    Perpetrator  No    History of family trauma? No    Strengths/Weaknesses    Strengths  Manages health care needs, Understands medication plan, Independent in ADL's, Adequate support system    Weakness  N/A    Needs assessment    Patient- Primary language spoken English     Family- Primary language spoken English    Patient- able to read Yes    Family- able to read Yes    Patient- Primary language read [place]    Family- Primary language read Kirill Mcgrathshanda Consumer/Family ability and readiness for treatment and learning    Educational  Medication education including signs/symptoms of illness, Use of commonly service systems, Goal setting, Wellness, Advantages of attending groups, Dynamics of substance/chemical abuse, Lifestyle changes, How to build on strengths, Signs/symptoms of alcohol/drug withdraw, Social Skills and Attendance problems with school    Learning ability  Patient has the desire and motivation to learn more about her alcohol use and how it affects her mental health  Diagnosis  Encounter Diagnoses   Name Primary? • Anxiety Yes   • Alcohol use disorder, severe, dependence (HCC)          Clinical Summary    Was family involved in the assessment/treatment recommendation? no    Clinical summary  Ann-Marie Yoel presented for an initial evaluation today; she is a self referral after being discharged from [facility] for Alcohol Use   Ann-Marie Diallo states she sought assistance after being unsuccessful to decreaseher drinking on her own in the past; she reports having 3-4 days of success and returning to previous levels of drinking  Edmond Teixeira states she realized she needed medical assistance and has had a month of recovery since her discharge  Edmond Teixeira will continue to utilize her family doctor for medicated assisted treatment  Recommendations- Summary  Edmond Teixeira identified that she has been successful in her recovery recently by avoiding triggers; however Edmond Teixeira did identify the need to address and identify these triggers  Edmond Teixeira is in agreement with utilizing psychotherapy services for support if her insurance will cover; she is transitioning to another network at this time  Referrals/Consults/Linkage  Evaluation only at this time; will return for individual therapy if covered by new insurance

## 2022-11-17 ENCOUNTER — TELEPHONE (OUTPATIENT)
Dept: PSYCHIATRY | Facility: CLINIC | Age: 32
End: 2022-11-17

## 2022-11-17 NOTE — TELEPHONE ENCOUNTER
Patient was called regarding whether she would be continuing with treatment  Patient was encouraged to continue treatment until her insurance changes to ensure continuity of care  Patient is agreement

## 2022-11-18 ENCOUNTER — TELEPHONE (OUTPATIENT)
Dept: PSYCHIATRY | Facility: CLINIC | Age: 32
End: 2022-11-18

## 2022-11-22 ENCOUNTER — TELEPHONE (OUTPATIENT)
Dept: PSYCHIATRY | Facility: CLINIC | Age: 32
End: 2022-11-22

## 2022-12-20 ENCOUNTER — TELEPHONE (OUTPATIENT)
Dept: FAMILY MEDICINE CLINIC | Facility: CLINIC | Age: 32
End: 2022-12-20

## 2022-12-20 DIAGNOSIS — J06.9 ACUTE UPPER RESPIRATORY INFECTION: Primary | ICD-10-CM

## 2022-12-20 NOTE — TELEPHONE ENCOUNTER
Patients daughter tested positive for strep today and patient now has symptoms, sore throat   She is wondering if a Rx for amoxicillin can be sent in for her, I was advised she may need an appointment but she wanted me to ask first

## 2022-12-21 RX ORDER — AMOXICILLIN 500 MG/1
500 TABLET, FILM COATED ORAL 3 TIMES DAILY
Qty: 21 TABLET | Refills: 0 | Status: SHIPPED | OUTPATIENT
Start: 2022-12-21 | End: 2022-12-28

## 2023-04-07 ENCOUNTER — OFFICE VISIT (OUTPATIENT)
Dept: OBGYN CLINIC | Facility: MEDICAL CENTER | Age: 33
End: 2023-04-07

## 2023-04-07 VITALS
HEIGHT: 62 IN | WEIGHT: 121 LBS | SYSTOLIC BLOOD PRESSURE: 138 MMHG | DIASTOLIC BLOOD PRESSURE: 80 MMHG | BODY MASS INDEX: 22.26 KG/M2

## 2023-04-07 DIAGNOSIS — B96.89 BACTERIAL VAGINOSIS: ICD-10-CM

## 2023-04-07 DIAGNOSIS — Z30.09 CONTRACEPTIVE EDUCATION: ICD-10-CM

## 2023-04-07 DIAGNOSIS — N76.0 BACTERIAL VAGINOSIS: ICD-10-CM

## 2023-04-07 DIAGNOSIS — N89.8 VAGINAL DISCHARGE: Primary | ICD-10-CM

## 2023-04-07 LAB
BV WHIFF TEST VAG QL: ABNORMAL
CLUE CELLS SPEC QL WET PREP: ABNORMAL
PH SMN: 5.5 [PH]
SL AMB POCT WET MOUNT: ABNORMAL
T VAGINALIS VAG QL WET PREP: ABNORMAL
YEAST VAG QL WET PREP: ABNORMAL

## 2023-04-07 RX ORDER — METRONIDAZOLE 500 MG/1
500 TABLET ORAL EVERY 12 HOURS SCHEDULED
Qty: 14 TABLET | Refills: 0 | Status: SHIPPED | OUTPATIENT
Start: 2023-04-07 | End: 2023-04-14

## 2023-05-02 ENCOUNTER — OFFICE VISIT (OUTPATIENT)
Dept: URGENT CARE | Facility: MEDICAL CENTER | Age: 33
End: 2023-05-02

## 2023-05-02 VITALS
WEIGHT: 121 LBS | OXYGEN SATURATION: 98 % | HEIGHT: 62 IN | TEMPERATURE: 98.4 F | HEART RATE: 75 BPM | SYSTOLIC BLOOD PRESSURE: 130 MMHG | RESPIRATION RATE: 16 BRPM | DIASTOLIC BLOOD PRESSURE: 80 MMHG | BODY MASS INDEX: 22.26 KG/M2

## 2023-05-02 DIAGNOSIS — N30.01 ACUTE CYSTITIS WITH HEMATURIA: Primary | ICD-10-CM

## 2023-05-02 DIAGNOSIS — R30.0 DYSURIA: ICD-10-CM

## 2023-05-02 LAB
SL AMB  POCT GLUCOSE, UA: ABNORMAL
SL AMB LEUKOCYTE ESTERASE,UA: ABNORMAL
SL AMB POCT BILIRUBIN,UA: ABNORMAL
SL AMB POCT BLOOD,UA: ABNORMAL
SL AMB POCT CLARITY,UA: CLEAR
SL AMB POCT COLOR,UA: ABNORMAL
SL AMB POCT KETONES,UA: ABNORMAL
SL AMB POCT NITRITE,UA: ABNORMAL
SL AMB POCT PH,UA: ABNORMAL
SL AMB POCT SPECIFIC GRAVITY,UA: 1.03
SL AMB POCT URINE HCG: NEGATIVE
SL AMB POCT URINE PROTEIN: ABNORMAL
SL AMB POCT UROBILINOGEN: ABNORMAL

## 2023-05-02 RX ORDER — PHENAZOPYRIDINE HYDROCHLORIDE 200 MG/1
200 TABLET, FILM COATED ORAL
Qty: 10 TABLET | Refills: 0 | Status: SHIPPED | OUTPATIENT
Start: 2023-05-02

## 2023-05-02 RX ORDER — SULFAMETHOXAZOLE AND TRIMETHOPRIM 800; 160 MG/1; MG/1
1 TABLET ORAL EVERY 12 HOURS SCHEDULED
Qty: 10 TABLET | Refills: 0 | Status: SHIPPED | OUTPATIENT
Start: 2023-05-02 | End: 2023-05-07

## 2023-05-02 NOTE — PATIENT INSTRUCTIONS
Take prescription antibiotic as prescribed  Your urine sample was sent to our labs for culture  The office will contact you following results to either continue your current antibiotics or change to a different antibiotic that will work better  You may have been prescribed phenazopyridine (Pyridium) to take for relief of UTI symptoms such as pain, burning, irritation, and urinary frequency  It should only be taken for the first 48 hours of treatment  This drug causes orange/yellow discoloration of the urine, which is a normal side effect     You should stay properly hydrated and frequently urinate to help flush out the infection  Warm compresses for abdominal discomfort  Follow up with your PCP in 3-5 days  Proceed to ER if your symptoms worsen  If you experience worsening abdominal/back pain, fever/chills, bloody urination

## 2023-05-03 NOTE — PROGRESS NOTES
[facility]        NAME: Eliel Vasques is a 28 y o  female  : 1990    MRN: [id number]  DATE: May 3, 2023  TIME: 8:29 AM    Assessment and Plan   Acute cystitis with hematuria [N30 01]  1  Acute cystitis with hematuria  sulfamethoxazole-trimethoprim (BACTRIM DS) 800-160 mg per tablet    phenazopyridine (PYRIDIUM) 200 mg tablet      2  Dysuria  POCT urine dip    POCT urine HCG    Urine culture        POCT urine dip unreliable due to Azo, though positive for large blood  Patient Instructions     1  Take prescription antibiotic as prescribed  a  Your urine sample was sent to our labs for culture  The office will contact you following results to either continue your current antibiotics or change to a different antibiotic that will work better  2  You may have been prescribed phenazopyridine (Pyridium) to take for relief of UTI symptoms such as pain, burning, irritation, and urinary frequency  It should only be taken for the first 48 hours of treatment  This drug causes orange/yellow discoloration of the urine, which is a normal side effect  3  You should stay properly hydrated and frequently urinate to help flush out the infection  4  Warm compresses for abdominal discomfort  5  Follow up with your PCP in 3-5 days  6  Proceed to ER if your symptoms worsen  a  If you experience worsening abdominal/back pain, fever/chills, bloody urination       Chief Complaint     Chief Complaint   Patient presents with    Possible UTI     Patient with urinary frequency, dysuria, pressure in lower abd since this am         History of Present Illness       39-year-old female presents to the urgent care for evaluation of urinary burning, frequency, urgency  She notes that symptoms began this morning  The patient has not noticed any blood in her urine  She admits to some diarrhea and nausea but denies any vomiting  She has been sweating on and off all day but denies any fevers    The patient does admit to some "bilateral flank pain and suprapubic pressure  For symptom relief the patient has tried Azo  She notes that the last time she had a UTI was \"years ago\"  Review of Systems   Review of Systems   Constitutional: Positive for diaphoresis  Negative for chills and fever  HENT: Negative for congestion, ear pain, rhinorrhea and sore throat  Eyes: Negative for pain and visual disturbance  Respiratory: Negative for cough, chest tightness and shortness of breath  Cardiovascular: Negative for chest pain and palpitations  Gastrointestinal: Positive for diarrhea and nausea  Negative for abdominal pain, blood in stool and vomiting  Genitourinary: Positive for dysuria, flank pain, frequency and urgency  Negative for hematuria  Musculoskeletal: Negative for arthralgias and back pain  Skin: Negative for color change and rash  Neurological: Negative for dizziness, seizures, syncope and headaches  Psychiatric/Behavioral: Negative  All other systems reviewed and are negative  Current Medications       Current Outpatient Medications:     phenazopyridine (PYRIDIUM) 200 mg tablet, Take 1 tablet (200 mg total) by mouth 3 (three) times a day with meals, Disp: 10 tablet, Rfl: 0    sulfamethoxazole-trimethoprim (BACTRIM DS) 800-160 mg per tablet, Take 1 tablet by mouth every 12 (twelve) hours for 5 days, Disp: 10 tablet, Rfl: 0    Current Allergies     Allergies as of 05/02/2023    (No Known Allergies)            The following portions of the patient's history were reviewed and updated as appropriate: allergies, current medications, past family history, past medical history, past social history, past surgical history and problem list      Past Medical History:   Diagnosis Date    ADHD (attention deficit hyperactivity disorder)     Anxiety     Chickenpox     Depression     Eczema     Herpes genitalis     Varicella        No past surgical history on file      Family History   Problem Relation Age of " "Onset    COPD Mother     Hypertension Mother     Hyperlipidemia Father     Hypertension Father     Addiction problem Brother     Lung cancer Maternal Grandmother     Heart attack Paternal Grandmother     Breast cancer Neg Hx     Ovarian cancer Neg Hx     Colon cancer Neg Hx          Medications have been verified  Objective   /80   Pulse 75   Temp 98 4 °F (36 9 °C) (Tympanic)   Resp 16   Ht 5' 2\" (1 575 m)   Wt 54 9 kg (121 lb)   LMP 04/18/2023 (Approximate)   SpO2 98%   BMI 22 13 kg/m²        Physical Exam     Physical Exam  Vitals and nursing note reviewed  Constitutional:       Appearance: Normal appearance  HENT:      Head: Normocephalic and atraumatic  Nose: Nose normal       Mouth/Throat:      Pharynx: Oropharynx is clear  Eyes:      Extraocular Movements: Extraocular movements intact  Pupils: Pupils are equal, round, and reactive to light  Cardiovascular:      Rate and Rhythm: Normal rate and regular rhythm  Pulses: Normal pulses  Heart sounds: Normal heart sounds  Pulmonary:      Effort: Pulmonary effort is normal  No respiratory distress  Breath sounds: Normal breath sounds  No wheezing or rhonchi  Abdominal:      General: Bowel sounds are normal       Palpations: Abdomen is soft  Tenderness: There is abdominal tenderness (suprapubic)  There is left CVA tenderness  There is no right CVA tenderness, guarding or rebound  Musculoskeletal:         General: Normal range of motion  Cervical back: Normal range of motion  Lymphadenopathy:      Cervical: No cervical adenopathy  Skin:     General: Skin is warm and dry  Capillary Refill: Capillary refill takes less than 2 seconds  Neurological:      General: No focal deficit present  Mental Status: She is alert and oriented to person, place, and time     Psychiatric:         Mood and Affect: Mood normal          Behavior: Behavior normal                    "

## 2023-05-04 LAB
BACTERIA UR CULT: ABNORMAL

## 2023-05-23 ENCOUNTER — TELEPHONE (OUTPATIENT)
Dept: OBGYN CLINIC | Facility: MEDICAL CENTER | Age: 33
End: 2023-05-23

## 2023-06-06 ENCOUNTER — TELEPHONE (OUTPATIENT)
Dept: OBGYN CLINIC | Facility: MEDICAL CENTER | Age: 33
End: 2023-06-06

## 2023-06-06 DIAGNOSIS — N76.0 BACTERIAL VAGINOSIS: Primary | ICD-10-CM

## 2023-06-06 DIAGNOSIS — B96.89 BACTERIAL VAGINOSIS: Primary | ICD-10-CM

## 2023-06-06 RX ORDER — METRONIDAZOLE 7.5 MG/G
1 GEL VAGINAL
Qty: 70 G | Refills: 0 | Status: SHIPPED | OUTPATIENT
Start: 2023-06-06 | End: 2023-06-11

## 2023-06-06 NOTE — TELEPHONE ENCOUNTER
Patient was prescribed on 05/23/23 medication for BV and it went away and came back. Patient would like to know if there was a way to send a cream antibiotic. Pharmacy on file. Please review when you get a chance.  Thank you

## 2023-09-12 ENCOUNTER — LAB (OUTPATIENT)
Dept: LAB | Facility: HOSPITAL | Age: 33
End: 2023-09-12

## 2023-09-12 DIAGNOSIS — Z00.8 ENCOUNTER FOR OTHER GENERAL EXAMINATION: ICD-10-CM

## 2023-09-12 LAB
CHOLEST SERPL-MCNC: 182 MG/DL
EST. AVERAGE GLUCOSE BLD GHB EST-MCNC: 114 MG/DL
HBA1C MFR BLD: 5.6 %
HDLC SERPL-MCNC: 70 MG/DL
LDLC SERPL CALC-MCNC: 89 MG/DL (ref 0–100)
NONHDLC SERPL-MCNC: 112 MG/DL
TRIGL SERPL-MCNC: 114 MG/DL

## 2023-09-12 PROCEDURE — 36415 COLL VENOUS BLD VENIPUNCTURE: CPT

## 2023-09-12 PROCEDURE — 83036 HEMOGLOBIN GLYCOSYLATED A1C: CPT

## 2023-09-12 PROCEDURE — 80061 LIPID PANEL: CPT

## 2023-09-26 ENCOUNTER — OFFICE VISIT (OUTPATIENT)
Dept: FAMILY MEDICINE CLINIC | Facility: CLINIC | Age: 33
End: 2023-09-26
Payer: COMMERCIAL

## 2023-09-26 VITALS
DIASTOLIC BLOOD PRESSURE: 90 MMHG | HEART RATE: 69 BPM | OXYGEN SATURATION: 96 % | WEIGHT: 127.7 LBS | TEMPERATURE: 98.3 F | BODY MASS INDEX: 23.36 KG/M2 | SYSTOLIC BLOOD PRESSURE: 140 MMHG

## 2023-09-26 DIAGNOSIS — L23.7 POISON IVY DERMATITIS: ICD-10-CM

## 2023-09-26 DIAGNOSIS — F41.9 ANXIETY: ICD-10-CM

## 2023-09-26 DIAGNOSIS — J02.0 STREP PHARYNGITIS: Primary | ICD-10-CM

## 2023-09-26 PROCEDURE — 99214 OFFICE O/P EST MOD 30 MIN: CPT | Performed by: STUDENT IN AN ORGANIZED HEALTH CARE EDUCATION/TRAINING PROGRAM

## 2023-09-26 RX ORDER — AMOXICILLIN AND CLAVULANATE POTASSIUM 875; 125 MG/1; MG/1
1 TABLET, FILM COATED ORAL EVERY 12 HOURS SCHEDULED
Qty: 10 TABLET | Refills: 0 | Status: SHIPPED | OUTPATIENT
Start: 2023-09-26 | End: 2023-10-01

## 2023-09-26 RX ORDER — CITALOPRAM 40 MG/1
40 TABLET ORAL DAILY
Qty: 30 TABLET | Refills: 1 | Status: SHIPPED | OUTPATIENT
Start: 2023-09-26

## 2023-09-26 NOTE — PROGRESS NOTES
Name: Kathy Duran      : 1990      MRN: [id number]  Encounter Provider: Higinio Vidal MD  Encounter Date: 2023   Encounter department: Counts include 234 beds at the [facility]     1. Strep pharyngitis  -     amoxicillin-clavulanate (AUGMENTIN) 875-125 mg per tablet; Take 1 tablet by mouth every 12 (twelve) hours for 5 days    2. Poison ivy dermatitis    3. Anxiety  -     citalopram (CeleXA) 40 mg tablet; Take 1 tablet (40 mg total) by mouth daily       Rapid strep in office positive. Prescribed patient Augmentin to be taken for 5 days. Advised patient follow-up with office if symptoms worsen or fail to improve. Additionally, patient noted to have poison ivy dermatitis along her right forearm. Advised patient may use calamine lotion for relief. Patient has been off of her SSRI medication for anxiety since January after weaning herself off due to lapse in insurance. Patient would like to resume her medications at this time. Patient was on Celexa and Wellbutrin. Will resume Celexa at this time and monitor in 1 month during annual physical with PCP. May consider adding Wellbutrin at that time depending on patient response. Subjective      Sore Throat   This is a recurrent problem. The current episode started yesterday. The problem has been gradually worsening. There has been no fever. The pain is at a severity of 4/10. Associated symptoms include headaches and a hoarse voice. Pertinent negatives include no abdominal pain, congestion, coughing, diarrhea, shortness of breath or vomiting. She has had no exposure to strep or mono. She has tried nothing for the symptoms. The treatment provided no relief. Review of Systems   HENT: Positive for hoarse voice and sore throat. Negative for congestion. Respiratory: Negative for cough and shortness of breath. Gastrointestinal: Negative for abdominal pain, diarrhea and vomiting.    Neurological: Positive for headaches. Current Outpatient Medications on File Prior to Visit   Medication Sig   • phenazopyridine (PYRIDIUM) 200 mg tablet Take 1 tablet (200 mg total) by mouth 3 (three) times a day with meals       Objective     /90 (BP Location: Right arm, Patient Position: Sitting, Cuff Size: Standard)   Pulse 69   Temp 98.3 °F (36.8 °C) (Temporal)   Wt 57.9 kg (127 lb 11.2 oz)   SpO2 96%   BMI 23.36 kg/m²     Physical Exam  Constitutional:       Appearance: Normal appearance. HENT:      Head: Normocephalic and atraumatic. Right Ear: Tympanic membrane and ear canal normal.      Left Ear: Tympanic membrane and ear canal normal.      Mouth/Throat:      Mouth: Mucous membranes are moist.      Pharynx: Oropharyngeal exudate present. No posterior oropharyngeal erythema. Eyes:      General:         Right eye: No discharge. Left eye: No discharge. Conjunctiva/sclera: Conjunctivae normal.      Pupils: Pupils are equal, round, and reactive to light. Cardiovascular:      Rate and Rhythm: Normal rate and regular rhythm. Heart sounds: Normal heart sounds. No murmur heard. Pulmonary:      Effort: Pulmonary effort is normal. No respiratory distress. Breath sounds: Normal breath sounds. No wheezing. Abdominal:      Palpations: Abdomen is soft. Tenderness: There is no abdominal tenderness. Musculoskeletal:      Right lower leg: No edema. Left lower leg: No edema. Skin:     Findings: Rash (poison ivy rash noted along right forearm) present. Neurological:      Mental Status: She is alert.        Nury Jackson MD

## 2023-11-29 ENCOUNTER — OFFICE VISIT (OUTPATIENT)
Dept: FAMILY MEDICINE CLINIC | Facility: CLINIC | Age: 33
End: 2023-11-29
Payer: COMMERCIAL

## 2023-11-29 VITALS
RESPIRATION RATE: 16 BRPM | HEART RATE: 89 BPM | DIASTOLIC BLOOD PRESSURE: 98 MMHG | TEMPERATURE: 97 F | HEIGHT: 62 IN | BODY MASS INDEX: 23.37 KG/M2 | OXYGEN SATURATION: 98 % | SYSTOLIC BLOOD PRESSURE: 152 MMHG | WEIGHT: 127 LBS

## 2023-11-29 DIAGNOSIS — Z82.69 FAMILY HISTORY OF SCLERODERMA: ICD-10-CM

## 2023-11-29 DIAGNOSIS — Z82.69 FAMILY HISTORY OF SYSTEMIC LUPUS ERYTHEMATOSUS: ICD-10-CM

## 2023-11-29 DIAGNOSIS — F41.9 ANXIETY: Primary | ICD-10-CM

## 2023-11-29 DIAGNOSIS — Z23 ENCOUNTER FOR IMMUNIZATION: ICD-10-CM

## 2023-11-29 PROCEDURE — 90686 IIV4 VACC NO PRSV 0.5 ML IM: CPT

## 2023-11-29 PROCEDURE — 90471 IMMUNIZATION ADMIN: CPT

## 2023-11-29 PROCEDURE — 99214 OFFICE O/P EST MOD 30 MIN: CPT | Performed by: STUDENT IN AN ORGANIZED HEALTH CARE EDUCATION/TRAINING PROGRAM

## 2023-11-29 RX ORDER — HYDROXYZINE HYDROCHLORIDE 25 MG/1
25 TABLET, FILM COATED ORAL EVERY 6 HOURS PRN
Qty: 30 TABLET | Refills: 0 | Status: SHIPPED | OUTPATIENT
Start: 2023-11-29

## 2023-11-29 RX ORDER — CITALOPRAM 40 MG/1
40 TABLET ORAL DAILY
Qty: 90 TABLET | Refills: 1 | Status: SHIPPED | OUTPATIENT
Start: 2023-11-29

## 2023-11-29 NOTE — PROGRESS NOTES
Name: Matthew Vasquez      : 1990      MRN: [id number]  Encounter Provider: Elena Workman MD  Encounter Date: 2023   Encounter department: [address] [place] [address]     1. Anxiety  -     hydrOXYzine HCL (ATARAX) 25 mg tablet; Take 1 tablet (25 mg total) by mouth every 6 (six) hours as needed for anxiety  -     citalopram (CeleXA) 40 mg tablet; Take 1 tablet (40 mg total) by mouth daily  -     TSH, 3rd generation with Free T4 reflex; Future  -     Vitamin D 25 hydroxy; Future  -     Ambulatory referral to [employer]; Future    2. Encounter for immunization  -     influenza vaccine, quadrivalent, 0.5 mL, preservative-free, for adult and pediatric patients 6 mos+ (AFLURIA, FLUARIX, FLULAVAL, FLUZONE)    3. Family history of systemic lupus erythematosus  -     CBC and differential; Future  -     KIRESTEN w/Reflex if Positive; Future    4. Family history of scleroderma  -     CBC and differential; Future  -     KIERSTEN w/Reflex if Positive; Future      Added Atarax to take as needed for panic like episodes. Advised patient to keep a journal of when these episodes happen, will check how often and when she needs medication at next visit. Recommended to continue Celexa at this time given improvement anxiety, refill provided at today's visit. Additionally, referral placed to psych services for therapy. KIERSTEN with reflex ordered to evaluate patient for lupus and scleroderma given symptoms and family history. Will also check vitamin D and thyroid levels. Will follow-up results with the patient. Patient to return in 1 month for annual physical.      Depression Screening and Follow-up Plan: Patient was screened for depression during today's encounter. They screened negative with a PHQ-2 score of 0. Subjective      HPI  Patient here to follow-up after resumption of Celexa 6 weeks ago.   Patient reports her anxiety has been significantly better at around 4 weeks after resuming medication. Patient however says that she does get panic-like episodes where she experiences some trouble breathing and is overwhelmed by stress. Patient reports she has these episodes a few times a week. Patient additionally would like work-up for lupus and scleroderma. Patient reports that she has family members who passed at young ages of these conditions and would like to know if she carries the diagnosis. Patient does endorse that she has had a butterfly rash on her face since giving birth to her child 12 years ago, as well as a Raynaud's and intermittent pain in her knees bilaterally. Review of Systems   Constitutional:  Negative for chills and fever. HENT:  Negative for sore throat. Respiratory:  Negative for cough and shortness of breath. Cardiovascular:  Negative for chest pain and palpitations. Gastrointestinal:  Negative for abdominal pain, constipation, diarrhea, nausea and vomiting. Genitourinary:  Negative for difficulty urinating and dysuria. Neurological:  Negative for dizziness and headaches. Current Outpatient Medications on File Prior to Visit   Medication Sig   • [DISCONTINUED] citalopram (CeleXA) 40 mg tablet Take 1 tablet (40 mg total) by mouth daily   • [DISCONTINUED] phenazopyridine (PYRIDIUM) 200 mg tablet Take 1 tablet (200 mg total) by mouth 3 (three) times a day with meals (Patient not taking: Reported on 11/29/2023)       Objective     /98 (BP Location: Left arm, Patient Position: Sitting, Cuff Size: Standard)   Pulse 89   Temp (!) 97 °F (36.1 °C) (Tympanic)   Resp 16   Ht 5' 2" (1.575 m)   Wt 57.6 kg (127 lb)   SpO2 98%   BMI 23.23 kg/m²     Physical Exam  Constitutional:       Appearance: Normal appearance. HENT:      Head: Normocephalic and atraumatic. Cardiovascular:      Rate and Rhythm: Normal rate and regular rhythm. Heart sounds: Normal heart sounds. No murmur heard.   Pulmonary:      Effort: Pulmonary effort is normal. No respiratory distress. Breath sounds: Normal breath sounds. No wheezing. Abdominal:      Palpations: Abdomen is soft. Tenderness: There is no abdominal tenderness. Musculoskeletal:      Right lower leg: No edema. Left lower leg: No edema. Skin:     Findings: Rash (malar rash noted across face) present. Neurological:      Mental Status: She is alert.        Meeta Parr MD

## 2023-11-30 ENCOUNTER — APPOINTMENT (OUTPATIENT)
Dept: LAB | Facility: CLINIC | Age: 33
End: 2023-11-30
Payer: COMMERCIAL

## 2023-11-30 ENCOUNTER — TELEPHONE (OUTPATIENT)
Dept: PSYCHIATRY | Facility: CLINIC | Age: 33
End: 2023-11-30

## 2023-11-30 DIAGNOSIS — Z82.69 FAMILY HISTORY OF SCLERODERMA: ICD-10-CM

## 2023-11-30 DIAGNOSIS — Z82.69 FAMILY HISTORY OF SYSTEMIC LUPUS ERYTHEMATOSUS: ICD-10-CM

## 2023-11-30 DIAGNOSIS — E55.9 VITAMIN D DEFICIENCY: Primary | ICD-10-CM

## 2023-11-30 DIAGNOSIS — F41.9 ANXIETY: ICD-10-CM

## 2023-11-30 LAB
25(OH)D3 SERPL-MCNC: 23.2 NG/ML (ref 30–100)
ANA SER QL IA: NEGATIVE
BASOPHILS # BLD AUTO: 0.05 THOUSANDS/ÂΜL (ref 0–0.1)
BASOPHILS NFR BLD AUTO: 1 % (ref 0–1)
EOSINOPHIL # BLD AUTO: 0.1 THOUSAND/ÂΜL (ref 0–0.61)
EOSINOPHIL NFR BLD AUTO: 2 % (ref 0–6)
ERYTHROCYTE [DISTWIDTH] IN BLOOD BY AUTOMATED COUNT: 15 % (ref 11.6–15.1)
HCT VFR BLD AUTO: 37.2 % (ref 34.8–46.1)
HGB BLD-MCNC: 11.9 G/DL (ref 11.5–15.4)
IMM GRANULOCYTES # BLD AUTO: 0.01 THOUSAND/UL (ref 0–0.2)
IMM GRANULOCYTES NFR BLD AUTO: 0 % (ref 0–2)
LYMPHOCYTES # BLD AUTO: 1.74 THOUSANDS/ÂΜL (ref 0.6–4.47)
LYMPHOCYTES NFR BLD AUTO: 34 % (ref 14–44)
MCH RBC QN AUTO: 29.3 PG (ref 26.8–34.3)
MCHC RBC AUTO-ENTMCNC: 32 G/DL (ref 31.4–37.4)
MCV RBC AUTO: 92 FL (ref 82–98)
MONOCYTES # BLD AUTO: 0.36 THOUSAND/ÂΜL (ref 0.17–1.22)
MONOCYTES NFR BLD AUTO: 7 % (ref 4–12)
NEUTROPHILS # BLD AUTO: 2.89 THOUSANDS/ÂΜL (ref 1.85–7.62)
NEUTS SEG NFR BLD AUTO: 56 % (ref 43–75)
NRBC BLD AUTO-RTO: 0 /100 WBCS
PLATELET # BLD AUTO: 353 THOUSANDS/UL (ref 149–390)
PMV BLD AUTO: 10.1 FL (ref 8.9–12.7)
RBC # BLD AUTO: 4.06 MILLION/UL (ref 3.81–5.12)
TSH SERPL DL<=0.05 MIU/L-ACNC: 1.35 UIU/ML (ref 0.45–4.5)
WBC # BLD AUTO: 5.15 THOUSAND/UL (ref 4.31–10.16)

## 2023-11-30 PROCEDURE — 85025 COMPLETE CBC W/AUTO DIFF WBC: CPT

## 2023-11-30 PROCEDURE — 36415 COLL VENOUS BLD VENIPUNCTURE: CPT

## 2023-11-30 PROCEDURE — 86038 ANTINUCLEAR ANTIBODIES: CPT

## 2023-11-30 PROCEDURE — 82306 VITAMIN D 25 HYDROXY: CPT

## 2023-11-30 PROCEDURE — 84443 ASSAY THYROID STIM HORMONE: CPT

## 2023-11-30 RX ORDER — MELATONIN
1000 DAILY
Qty: 30 TABLET | Refills: 2 | Status: SHIPPED | OUTPATIENT
Start: 2023-11-30

## 2023-11-30 NOTE — TELEPHONE ENCOUNTER
Response received. Patient has been added to [facility] List at this time for talk therapy services.

## 2023-11-30 NOTE — TELEPHONE ENCOUNTER
Routine referral letter sent via [address] [place] [address]. If no response is received by 12/15/2023, a f/u call will be made to patient.

## 2024-01-02 ENCOUNTER — TELEPHONE (OUTPATIENT)
Dept: FAMILY MEDICINE CLINIC | Facility: CLINIC | Age: 34
End: 2024-01-02

## 2024-01-23 DIAGNOSIS — F41.9 ANXIETY: ICD-10-CM

## 2024-01-23 RX ORDER — HYDROXYZINE HYDROCHLORIDE 25 MG/1
25 TABLET, FILM COATED ORAL EVERY 6 HOURS PRN
Qty: 30 TABLET | Refills: 5 | Status: SHIPPED | OUTPATIENT
Start: 2024-01-23

## 2024-01-24 ENCOUNTER — OFFICE VISIT (OUTPATIENT)
Dept: OBGYN CLINIC | Facility: MEDICAL CENTER | Age: 34
End: 2024-01-24
Payer: COMMERCIAL

## 2024-01-24 VITALS
BODY MASS INDEX: 25.06 KG/M2 | SYSTOLIC BLOOD PRESSURE: 130 MMHG | WEIGHT: 136.2 LBS | DIASTOLIC BLOOD PRESSURE: 100 MMHG | HEIGHT: 62 IN

## 2024-01-24 DIAGNOSIS — N76.0 BACTERIAL VAGINOSIS: Primary | ICD-10-CM

## 2024-01-24 DIAGNOSIS — B96.89 BACTERIAL VAGINOSIS: ICD-10-CM

## 2024-01-24 DIAGNOSIS — B96.89 BACTERIAL VAGINOSIS: Primary | ICD-10-CM

## 2024-01-24 DIAGNOSIS — N76.0 BACTERIAL VAGINOSIS: ICD-10-CM

## 2024-01-24 LAB
BV WHIFF TEST VAG QL: ABNORMAL
CLUE CELLS SPEC QL WET PREP: PRESENT
PH SMN: 6 [PH]
SL AMB POCT WET MOUNT: ABNORMAL
T VAGINALIS VAG QL WET PREP: ABNORMAL
YEAST VAG QL WET PREP: ABNORMAL

## 2024-01-24 PROCEDURE — 87210 SMEAR WET MOUNT SALINE/INK: CPT | Performed by: NURSE PRACTITIONER

## 2024-01-24 PROCEDURE — 99213 OFFICE O/P EST LOW 20 MIN: CPT | Performed by: NURSE PRACTITIONER

## 2024-01-24 RX ORDER — CLINDAMYCIN PHOSPHATE 20 MG/G
1 CREAM VAGINAL
Qty: 40 G | Refills: 0 | Status: SHIPPED | OUTPATIENT
Start: 2024-01-24 | End: 2024-01-29

## 2024-01-24 RX ORDER — CLINDAMYCIN PHOSPHATE 20 MG/G
1 CREAM VAGINAL
Qty: 40 G | Refills: 0 | Status: SHIPPED | OUTPATIENT
Start: 2024-01-24 | End: 2024-01-24 | Stop reason: SDUPTHER

## 2024-01-24 NOTE — PROGRESS NOTES
"Assessment/Plan:      Diagnoses and all orders for this visit:    Bacterial vaginosis  -     POCT wet mount  -     Discontinue: clindamycin (CLEOCIN) 2 % vaginal cream; Insert 1 applicator into the vagina daily at bedtime for 5 days        - reviewed diagnosis of bacterial vaginosis and treatment with Cleocin. Written information provided  - hygiene reviewed including avoid scented soaps, lotions and lubricants; avoid douching; avoid tight/restrictive clothing  - signs and symptoms to report reviewed     RTO Annual exam     Subjective:     Patient ID: Michelle Patel is a 33 y.o. female.    HPI  here with complaints of vaginal odor and irritation for 1 week. LMP 23   Discharge is described as thin, white/gray.  Associated symptoms include foul odor.  Denies alleviating factors.  Aggravating factors include being hot, sweaty, intercourse and menses.  Has not tried any OTC remedies.  Has been getting infections intermittently since IUD placement.  Was treated 2023 with oral metronidazole; 2023 with MetroGel.  Patient self treated in November with remainder of MetroGel.  Symptoms typically will resolve but returned. Is sexually active using condoms for contraception. Denies new partner, fever, chills, bowel or bladder concerns.    Last pap smear 12/3/19 NILM  Completed gardasil vaccine     Review of Systems   Constitutional:  Negative for chills, fatigue and fever.   Respiratory: Negative.     Cardiovascular: Negative.    Genitourinary:  Positive for vaginal discharge. Negative for decreased urine volume, difficulty urinating, dyspareunia, dysuria, enuresis, flank pain, frequency, genital sores, hematuria, menstrual problem, pelvic pain, urgency, vaginal bleeding and vaginal pain.        Vaginal odor         Objective:  /100   Ht 5' 2\" (1.575 m)   Wt 61.8 kg (136 lb 3.2 oz)   LMP 2023 (Exact Date)   BMI 24.91 kg/m²      Physical Exam  Vitals reviewed. Exam conducted with a chaperone " present.   Constitutional:       Appearance: Normal appearance.   Abdominal:      Hernia: There is no hernia in the left inguinal area or right inguinal area.   Genitourinary:     General: Normal vulva.      Exam position: Lithotomy position.      Labia:         Right: No rash, tenderness, lesion or injury.         Left: No rash, tenderness, lesion or injury.       Vagina: Vaginal discharge present.      Cervix: Normal.      Comments: Moderate to large amount of thin off-white vaginal discharge noted on exam  Lymphadenopathy:      Lower Body: No right inguinal adenopathy. No left inguinal adenopathy.   Neurological:      Mental Status: She is alert and oriented to person, place, and time.   Psychiatric:         Mood and Affect: Mood normal.         Behavior: Behavior normal.

## 2024-01-24 NOTE — TELEPHONE ENCOUNTER
Patient called her medication, clindamycin (CLEOCIN) 2 % vaginal cream  was not available at [facility], she would like it called into [facility] in [place].

## 2024-03-12 DIAGNOSIS — F41.9 ANXIETY: ICD-10-CM

## 2024-03-12 RX ORDER — CITALOPRAM 40 MG/1
40 TABLET ORAL DAILY
Qty: 90 TABLET | Refills: 1 | Status: SHIPPED | OUTPATIENT
Start: 2024-03-12

## 2024-03-12 RX ORDER — HYDROXYZINE HYDROCHLORIDE 25 MG/1
25 TABLET, FILM COATED ORAL EVERY 6 HOURS PRN
Qty: 90 TABLET | Refills: 1 | Status: SHIPPED | OUTPATIENT
Start: 2024-03-12

## 2024-03-15 NOTE — TELEPHONE ENCOUNTER
----- Message from Wes Perez MA sent at 1/26/2021  3:57 PM EST -----  Regarding: FW: Non-Urgent Medical Question  Contact: [phone]    ----- Message -----  From: Jhon Ingram Amanda  Sent: 1/26/2021   3:37 PM EST  To: [facility]  Subject: Non-Urgent Eli [facility] Dr Aminata Kim,    I had my booster covid vaccine yesterday  This morning I woke up nauseous and so dizzy  I could not see straight and had to lower myself to the ground, I couldn't hear what my daughter was saying just a whooshing sound and eventually everything went black for a couple seconds  Things went back to normal and the rest of the day I just felt achy and fatigued which I expected  Does this sound like a side effect of the vaccine? I couldn't find any medical research supporting this  It really scared me  Let me know if I should be seen  I feel fine now  Thank you!   Joshua Walton sent

## 2024-05-21 NOTE — PROGRESS NOTES
Subjective      Michelle Patel is a 34 y.o. female who presents for annual well woman exam.  Last Pap smear 12/3/19 NILM.       Periods are typically regular every 28-30 days, lasting 5 days. The past 2 months menses have lasted about 14 days were significantly lighter and  described as light brown.  Has not had irregular bleeding in the past.  Denies vaginal discharge, irritation or odor, new partner.     Current contraception: coitus interruptus and condoms  History of abnormal Pap smear: no  Family history of uterine or ovarian cancer: no  Regular self breast exam: no  History of abnormal mammogram: Mammograms to begin age 40 unless otherwise clinically indicated  Family history of breast cancer: no  History of abnormal lipids: no  Gardasil vaccine: Completed series    Menstrual History:  OB History          2    Para   2    Term   2            AB        Living   2         SAB        IAB        Ectopic        Multiple   0    Live Births   2           Obstetric Comments   Menarche age 15    [id number] x 2   11 41 wks, female   c 2* lac Iol pd epidural. [facility]. Llllian   17 40 wks female   c 1* lac BF [facility]               Menarche age: 15  Patient's last menstrual period was 2024.  Period Cycle (Days):  (monthly)  Period Duration (Days): 5 (last 2 months 14 days of bleeding light/ spotting and brown)  Period Pattern: Regular  Menstrual Flow: Light, Moderate  Dysmenorrhea: None    The following portions of the patient's history were reviewed and updated as appropriate: allergies, current medications, past family history, past medical history, past social history, past surgical history, and problem list.    Review of Systems  Pertinent items are noted in HPI.      Objective      /78 (BP Location: Left arm, Patient Position: Sitting)   Wt 59.8 kg (131 lb 12.8 oz)   LMP 2024   BMI 24.11 kg/m²     General:   alert and oriented, in no acute distress, alert,  appears stated age, and cooperative   Heart: regular rate and rhythm, S1, S2 normal, no murmur, click, rub or gallop   Lungs: clear to auscultation bilaterally   Abdomen: soft, non-tender, without masses or organomegaly   Vulva: normal, Bartholin's, Urethra, [place]'s normal   Vagina: normal mucosa, normal discharge, no palpable nodules   Cervix: no bleeding following Pap, no cervical motion tenderness, and no lesions   Uterus: normal size, non-tender, normal shape and consistency   Adnexa: normal adnexa and no mass, fullness, tenderness   Breast: breasts appear normal, no suspicious masses, no skin or nipple changes or axillary nodes.              Assessment      @well woman@ .     Plan      All questions answered.  Await pap smear results.  Blood tests: Free T4 level and TSH.  Contraception: coitus interruptus and condoms.  Diagnosis explained in detail, including differential.  Dietary diary.  Discussed healthy lifestyle modifications.  Educational material distributed.  Follow up in 1 year.  Follow up as needed.  Pelvic ultrasound.  Thin prep Pap smear.  Breast awareness reviewed   AUB encouraged to complete pelvic ultrasound, TSH and free T4.  Reviewed recommendation for endometrial biopsy.  Patient verbalizes understanding will schedule appointment for biopsy after ultrasound.  Encouraged healthy diet, exercise and lifestyle  Encouraged follow-up with PCP as needed  Will call/MyChart message with results

## 2024-05-23 ENCOUNTER — ANNUAL EXAM (OUTPATIENT)
Dept: OBGYN CLINIC | Facility: MEDICAL CENTER | Age: 34
End: 2024-05-23
Payer: COMMERCIAL

## 2024-05-23 VITALS — WEIGHT: 131.8 LBS | BODY MASS INDEX: 24.11 KG/M2 | SYSTOLIC BLOOD PRESSURE: 108 MMHG | DIASTOLIC BLOOD PRESSURE: 78 MMHG

## 2024-05-23 DIAGNOSIS — Z01.419 WELL WOMAN EXAM WITH ROUTINE GYNECOLOGICAL EXAM: Primary | ICD-10-CM

## 2024-05-23 DIAGNOSIS — N93.9 ABNORMAL UTERINE BLEEDING (AUB): ICD-10-CM

## 2024-05-23 PROCEDURE — G0476 HPV COMBO ASSAY CA SCREEN: HCPCS | Performed by: NURSE PRACTITIONER

## 2024-05-23 PROCEDURE — G0145 SCR C/V CYTO,THINLAYER,RESCR: HCPCS | Performed by: NURSE PRACTITIONER

## 2024-05-23 PROCEDURE — S0612 ANNUAL GYNECOLOGICAL EXAMINA: HCPCS | Performed by: NURSE PRACTITIONER

## 2024-05-24 LAB
HPV HR 12 DNA CVX QL NAA+PROBE: NEGATIVE
HPV16 DNA CVX QL NAA+PROBE: NEGATIVE
HPV18 DNA CVX QL NAA+PROBE: NEGATIVE

## 2024-05-31 LAB
LAB AP GYN PRIMARY INTERPRETATION: NORMAL
Lab: NORMAL
PATH INTERP SPEC-IMP: NORMAL

## 2024-06-04 ENCOUNTER — TELEPHONE (OUTPATIENT)
Age: 34
End: 2024-06-04

## 2024-06-04 DIAGNOSIS — B37.31 VAGINAL YEAST INFECTION: Primary | ICD-10-CM

## 2024-06-04 RX ORDER — FLUCONAZOLE 150 MG/1
150 TABLET ORAL ONCE
Qty: 1 TABLET | Refills: 0 | Status: SHIPPED | OUTPATIENT
Start: 2024-06-04 | End: 2024-06-04

## 2024-06-04 NOTE — TELEPHONE ENCOUNTER
----- Message from BREA Marcelo sent at 6/4/2024  4:49 PM EDT -----  Please let patient know I sent in fluconazole.  Thank you  ----- Message -----  From: Ludy Bassett MA  Sent: 6/4/2024   3:53 PM EDT  To: BREA Rey    Patient is aware of results and recommendations. She does have slight discharge. She would prefer to have Diflucan sent to the [facility] in [place] on [facility].

## 2024-06-27 ENCOUNTER — OFFICE VISIT (OUTPATIENT)
Dept: FAMILY MEDICINE CLINIC | Facility: CLINIC | Age: 34
End: 2024-06-27
Payer: COMMERCIAL

## 2024-06-27 VITALS
SYSTOLIC BLOOD PRESSURE: 139 MMHG | BODY MASS INDEX: 22.82 KG/M2 | DIASTOLIC BLOOD PRESSURE: 84 MMHG | TEMPERATURE: 98.5 F | HEIGHT: 62 IN | HEART RATE: 85 BPM | OXYGEN SATURATION: 96 % | WEIGHT: 124 LBS

## 2024-06-27 DIAGNOSIS — R30.0 DYSURIA: ICD-10-CM

## 2024-06-27 DIAGNOSIS — N30.01 ACUTE CYSTITIS WITH HEMATURIA: ICD-10-CM

## 2024-06-27 DIAGNOSIS — Z71.3 DIETARY COUNSELING: ICD-10-CM

## 2024-06-27 DIAGNOSIS — E55.9 VITAMIN D DEFICIENCY: ICD-10-CM

## 2024-06-27 DIAGNOSIS — Z00.00 ANNUAL PHYSICAL EXAM: Primary | ICD-10-CM

## 2024-06-27 DIAGNOSIS — Z71.82 EXERCISE COUNSELING: ICD-10-CM

## 2024-06-27 DIAGNOSIS — F41.9 ANXIETY: ICD-10-CM

## 2024-06-27 PROBLEM — F10.939 ALCOHOL WITHDRAWAL SYNDROME WITH COMPLICATION (HCC): Status: RESOLVED | Noted: 2022-10-06 | Resolved: 2024-06-27

## 2024-06-27 PROBLEM — S16.1XXA CERVICAL STRAIN: Status: RESOLVED | Noted: 2021-11-03 | Resolved: 2024-06-27

## 2024-06-27 PROBLEM — F10.20 ALCOHOL USE DISORDER, SEVERE, DEPENDENCE (HCC): Status: RESOLVED | Noted: 2022-09-13 | Resolved: 2024-06-27

## 2024-06-27 PROBLEM — F98.8 OTHER SPECIFIED BEHAVIORAL AND EMOTIONAL DISORDERS WITH ONSET USUALLY OCCURRING IN CHILDHOOD AND ADOLESCENCE: Status: RESOLVED | Noted: 2017-04-19 | Resolved: 2024-06-27

## 2024-06-27 PROBLEM — R00.2 HEART PALPITATIONS: Status: RESOLVED | Noted: 2018-01-30 | Resolved: 2024-06-27

## 2024-06-27 PROBLEM — K62.5 BRBPR (BRIGHT RED BLOOD PER RECTUM): Status: RESOLVED | Noted: 2021-12-07 | Resolved: 2024-06-27

## 2024-06-27 PROBLEM — D53.9 MACROCYTIC ANEMIA: Status: RESOLVED | Noted: 2022-10-06 | Resolved: 2024-06-27

## 2024-06-27 LAB
SL AMB  POCT GLUCOSE, UA: NEGATIVE
SL AMB LEUKOCYTE ESTERASE,UA: ABNORMAL
SL AMB POCT BILIRUBIN,UA: NEGATIVE
SL AMB POCT BLOOD,UA: ABNORMAL
SL AMB POCT CLARITY,UA: ABNORMAL
SL AMB POCT COLOR,UA: YELLOW
SL AMB POCT KETONES,UA: ABNORMAL
SL AMB POCT NITRITE,UA: NEGATIVE
SL AMB POCT PH,UA: 5
SL AMB POCT SPECIFIC GRAVITY,UA: 1.02
SL AMB POCT URINE PROTEIN: ABNORMAL
SL AMB POCT UROBILINOGEN: NEGATIVE

## 2024-06-27 PROCEDURE — 99395 PREV VISIT EST AGE 18-39: CPT | Performed by: STUDENT IN AN ORGANIZED HEALTH CARE EDUCATION/TRAINING PROGRAM

## 2024-06-27 PROCEDURE — 87186 SC STD MICRODIL/AGAR DIL: CPT | Performed by: STUDENT IN AN ORGANIZED HEALTH CARE EDUCATION/TRAINING PROGRAM

## 2024-06-27 PROCEDURE — 81002 URINALYSIS NONAUTO W/O SCOPE: CPT | Performed by: STUDENT IN AN ORGANIZED HEALTH CARE EDUCATION/TRAINING PROGRAM

## 2024-06-27 PROCEDURE — 87077 CULTURE AEROBIC IDENTIFY: CPT | Performed by: STUDENT IN AN ORGANIZED HEALTH CARE EDUCATION/TRAINING PROGRAM

## 2024-06-27 PROCEDURE — 87086 URINE CULTURE/COLONY COUNT: CPT | Performed by: STUDENT IN AN ORGANIZED HEALTH CARE EDUCATION/TRAINING PROGRAM

## 2024-06-27 PROCEDURE — 99214 OFFICE O/P EST MOD 30 MIN: CPT | Performed by: STUDENT IN AN ORGANIZED HEALTH CARE EDUCATION/TRAINING PROGRAM

## 2024-06-27 RX ORDER — NITROFURANTOIN 25; 75 MG/1; MG/1
100 CAPSULE ORAL 2 TIMES DAILY
Qty: 10 CAPSULE | Refills: 0 | Status: SHIPPED | OUTPATIENT
Start: 2024-06-27 | End: 2024-07-02

## 2024-06-27 NOTE — ASSESSMENT & PLAN NOTE
Patient weaned herself off of Celexa medication.  Patient's anxiety has been well-controlled with Atarax as needed.  Patient remains on a wait list for therapy.  Will continue to monitor.

## 2024-06-27 NOTE — PROGRESS NOTES
Adult Annual Physical  Name: Michelle Patel      : 1990      MRN: [id number]  Encounter Provider: Donnie Deleon MD  Encounter Date: 2024   Encounter department: [facility]    Assessment & Plan   1. Annual physical exam  2. Acute cystitis with hematuria  -     nitrofurantoin (MACROBID) 100 mg capsule; Take 1 capsule (100 mg total) by mouth 2 (two) times a day for 5 days  -     Urine culture; Future  -     Urine culture  3. Anxiety  Assessment & Plan:  Patient weaned herself off of Celexa medication.  Patient's anxiety has been well-controlled with Atarax as needed.  Patient remains on a wait list for therapy.  Will continue to monitor.  4. Vitamin D deficiency  Assessment & Plan:  Patient taking over-the-counter multivitamin that contains vitamin D.  Will continue to monitor.  5. BMI 22.0-22.9, adult  6. Dysuria  -     POCT urine dip  -     Urine culture; Future  -     Urine culture  7. Dietary counseling  8. Exercise counseling    UA and symptoms significant for likely UTI, urine sent for culture at today's visit.  Provided patient with prescription for Macrobid.  Counseled patient on adequate hydration to help ensure resolution of infection.  Advised patient to follow-up with office if symptoms worsen or fail to improve.    Immunizations and preventive care screenings were discussed with patient today. Appropriate education was printed on patient's after visit summary.    Counseling:  Alcohol/drug use: discussed moderation in alcohol intake, the recommendations for healthy alcohol use, and avoidance of illicit drug use.  Dental Health: discussed importance of regular tooth brushing, flossing, and dental visits.  Exercise: the importance of regular exercise/physical activity was discussed. Recommend exercise 3-5 times per week for at least 30 minutes.          History of Present Illness       Urinary Tract Infection   This is a new problem. The current episode started  "yesterday. The problem occurs intermittently. The problem has been gradually worsening. The quality of the pain is described as burning. The pain is at a severity of 2/10. There has been no fever. She is Sexually active. There is A history of pyelonephritis. Associated symptoms include frequency, hematuria and urgency. Pertinent negatives include no chills, discharge, flank pain, nausea, possible pregnancy, sweats or vomiting. She has tried nothing for the symptoms. There is no history of kidney stones, recurrent UTIs, a single kidney, urinary stasis or a urological procedure.       Adult Annual Physical:  Patient presents for annual physical.     Diet and Physical Activity:  - Diet/Nutrition: well balanced diet.  - Exercise: no formal exercise.    Depression Screening:  - PHQ-2 Score: 0    General Health:  - Sleep: sleeps well and 7-8 hours of sleep on average.  - Hearing: normal hearing right ear.  - Vision: no vision problems.  - Dental: regular dental visits and brushes teeth twice daily.    /GYN Health:  - Follows with GYN: yes.   - Menopause: premenopausal.   - Last menstrual cycle: 6/26/2024.   - History of STDs: no  - Contraception: barrier methods.      Review of Systems   Constitutional:  Negative for chills and fever.   HENT:  Negative for ear pain and sore throat.    Eyes:  Negative for pain and visual disturbance.   Respiratory:  Negative for cough and shortness of breath.    Cardiovascular:  Negative for chest pain and palpitations.   Gastrointestinal:  Negative for abdominal pain, nausea and vomiting.   Genitourinary:  Positive for dysuria, frequency, hematuria and urgency. Negative for flank pain.   Musculoskeletal:  Negative for arthralgias and back pain.   Skin:  Negative for color change and rash.   Neurological:  Negative for seizures and syncope.         Objective     /84   Pulse 85   Temp 98.5 °F (36.9 °C) (Temporal)   Ht 5' 2\" (1.575 m)   Wt 56.2 kg (124 lb)   SpO2 96%   BMI " 22.68 kg/m²     Physical Exam  Constitutional:       General: She is not in acute distress.     Appearance: Normal appearance. She is well-developed. She is not ill-appearing.   HENT:      Head: Normocephalic and atraumatic.      Right Ear: Tympanic membrane and ear canal normal.      Left Ear: Tympanic membrane and ear canal normal.      Nose: Nose normal. No congestion or rhinorrhea.      Mouth/Throat:      Mouth: Mucous membranes are moist.      Pharynx: Oropharynx is clear.   Eyes:      General:         Right eye: No discharge.         Left eye: No discharge.      Extraocular Movements: Extraocular movements intact.      Conjunctiva/sclera: Conjunctivae normal.      Pupils: Pupils are equal, round, and reactive to light.   Cardiovascular:      Rate and Rhythm: Normal rate and regular rhythm.      Heart sounds: Normal heart sounds. No murmur heard.  Pulmonary:      Effort: Pulmonary effort is normal. No respiratory distress.      Breath sounds: Normal breath sounds. No wheezing.   Abdominal:      General: Abdomen is flat. Bowel sounds are normal. There is no distension.      Palpations: Abdomen is soft. There is no mass.      Tenderness: There is abdominal tenderness in the suprapubic area. There is no right CVA tenderness, left CVA tenderness, guarding or rebound.      Hernia: No hernia is present.   Musculoskeletal:      Cervical back: No tenderness.      Right lower leg: No edema.      Left lower leg: No edema.   Neurological:      Mental Status: She is alert and oriented to person, place, and time.   Psychiatric:         Mood and Affect: Mood normal.         Behavior: Behavior normal.       Administrative Statements

## 2024-06-29 LAB — BACTERIA UR CULT: ABNORMAL

## 2024-07-30 ENCOUNTER — OFFICE VISIT (OUTPATIENT)
Dept: FAMILY MEDICINE CLINIC | Facility: CLINIC | Age: 34
End: 2024-07-30
Payer: COMMERCIAL

## 2024-07-30 VITALS — OXYGEN SATURATION: 99 % | BODY MASS INDEX: 21.95 KG/M2 | WEIGHT: 120 LBS | HEART RATE: 93 BPM | TEMPERATURE: 98.3 F

## 2024-07-30 DIAGNOSIS — L23.7 ALLERGIC CONTACT DERMATITIS DUE TO PLANTS, EXCEPT FOOD: Primary | ICD-10-CM

## 2024-07-30 PROCEDURE — 96372 THER/PROPH/DIAG INJ SC/IM: CPT

## 2024-07-30 PROCEDURE — 99214 OFFICE O/P EST MOD 30 MIN: CPT | Performed by: FAMILY MEDICINE

## 2024-07-30 RX ORDER — MOMETASONE FUROATE 1 MG/G
CREAM TOPICAL DAILY
Qty: 45 G | Refills: 1 | Status: SHIPPED | OUTPATIENT
Start: 2024-07-30

## 2024-07-30 RX ORDER — METHYLPREDNISOLONE ACETATE 80 MG/ML
80 INJECTION, SUSPENSION INTRA-ARTICULAR; INTRALESIONAL; INTRAMUSCULAR; SOFT TISSUE ONCE
Status: COMPLETED | OUTPATIENT
Start: 2024-07-30 | End: 2024-07-30

## 2024-07-30 RX ADMIN — METHYLPREDNISOLONE ACETATE 80 MG: 80 INJECTION, SUSPENSION INTRA-ARTICULAR; INTRALESIONAL; INTRAMUSCULAR; SOFT TISSUE at 15:13

## 2024-07-30 NOTE — PROGRESS NOTES
Assessment/Plan:      Diagnoses and all orders for this visit:    Allergic contact dermatitis due to plants, except food  -     mometasone (ELOCON) 0.1 % cream; Apply topically daily  -     methylPREDNISolone acetate (DEPO-MEDROL) injection 80 mg          Subjective:     Patient ID: Michelle Patel is a 34 y.o. female.    Patient presents with:  Poison Ivy: Neck and arms        Poison Ivy  This is a new problem. The current episode started in the past 7 days. The problem is unchanged. The rash is diffuse.       Review of Systems   Constitutional: Negative.    HENT: Negative.     Respiratory: Negative.     Cardiovascular: Negative.    Gastrointestinal: Negative.    Skin:  Positive for rash.         Objective:     Physical Exam  Vitals and nursing note reviewed.   Constitutional:       Appearance: She is well-developed.   HENT:      Head: Normocephalic and atraumatic.   Eyes:      Pupils: Pupils are equal, round, and reactive to light.   Neck:      Vascular: No JVD.   Cardiovascular:      Rate and Rhythm: Normal rate.   Pulmonary:      Effort: Pulmonary effort is normal.   Abdominal:      Palpations: Abdomen is soft.   Musculoskeletal:      Cervical back: Normal range of motion.   Lymphadenopathy:      Cervical: No cervical adenopathy.   Skin:     Findings: Rash present.   Neurological:      General: No focal deficit present.      Mental Status: She is alert and oriented to person, place, and time.

## 2024-07-31 ENCOUNTER — HOSPITAL ENCOUNTER (OUTPATIENT)
Dept: ULTRASOUND IMAGING | Facility: MEDICAL CENTER | Age: 34
Discharge: HOME | End: 2024-07-31
Payer: COMMERCIAL

## 2024-07-31 DIAGNOSIS — N93.9 ABNORMAL UTERINE BLEEDING (AUB): ICD-10-CM

## 2024-07-31 PROCEDURE — 76830 TRANSVAGINAL US NON-OB: CPT

## 2024-07-31 PROCEDURE — 76856 US EXAM PELVIC COMPLETE: CPT

## 2024-09-03 ENCOUNTER — TELEPHONE (OUTPATIENT)
Age: 34
End: 2024-09-03

## 2024-09-03 NOTE — TELEPHONE ENCOUNTER
Contacted patient off of Talk Therapy  to verify needs of services in attempts to offer patient an appointment at available locations. LVM for patient to contact intake dept  in regards to wait list.     1st attempt.

## 2024-10-24 ENCOUNTER — TELEPHONE (OUTPATIENT)
Age: 34
End: 2024-10-24

## 2024-10-24 NOTE — TELEPHONE ENCOUNTER
"Behavioral Health Outpatient Intake Questions    Referred By   :     Please advise interviewee that they need to answer all questions truthfully to allow for best care, and any misrepresentations of information may affect their ability to be seen at this clinic   => Was this discussed? Yes     If Minor Child (under age 18)    Who is/are the legal guardian(s) of the child?     Is there a custody agreement? No     If \"YES\"- Custody orders must be obtained prior to scheduling the first appointment  In addition, Consent to Treatment must be signed by all legal guardians prior to scheduling the first appointment    If \"NO\"- Consent to Treatment must be signed by all legal guardians prior to scheduling the first appointment    Behavioral Health Outpatient Intake History -     Presenting Problem (in patient's own words): anxiety, depression    Are there any communication barriers for this patient?     No                                               If yes, please describe barriers:     If there is a unique situation, please refer to Ramez Tucker for final determination.    Are you taking any psychiatric medications? No     If \"YES\" -What are they         If \"YES\" -Who prescribes?     Has the Patient previously received outpatient Talk Therapy or Medication Management from [facility]  No        If \"YES\"- When, Where and with Whom?          If \"NO\" -Has Patient received these services elsewhere?       If \"YES\" -When, Where, and with Whom?     Has the Patient abused alcohol or other substances in the last 6 months ? No  No concerns of substance abuse are reported.     If \"YES\" -What substance, How much, How often?      If illegal substance: Refer to [facility] (for ZI) or [facility] Offices.   If Alcohol in excess of 10 drinks per week:  Refer to [facility] (for ZI) or [facility] Offices    Legal History-     Is this treatment court ordered? No   If \"yes \"send to :  Talk Therapy : Send to Ramez Hendrickson " "Caitlin for final determination   Med Management: Send to Dr Barrera for final determination     Has the Patient been convicted of a felony?  No   If \"Yes\" send to -When, What?   Talk Therapy: Send to Ramez Cooper/Patricia Tucker for final determination   Med Management: Send to Dr Barrera for final determination     ACCEPTED as a patient Yes  If \"Yes\" Appointment Date: 1/15    Referred Elsewhere? No  If “Yes” - (Where? Ex: [facility], [facility], [facility], [facility], etc.)        Name of Insurance Co:[employer]  Insurance ID#   Insurance Phone #   If ins is primary or secondary?primary  If patient is a minor, parents information such as Name, D.O.B of guarantor.  "

## 2024-10-31 ENCOUNTER — TELEPHONE (OUTPATIENT)
Dept: PSYCHIATRY | Facility: CLINIC | Age: 34
End: 2024-10-31

## 2024-10-31 NOTE — TELEPHONE ENCOUNTER
One week follow up call for New Patient appointment with Jenny Osman on 1/15/25  was made on 10/31/24. Writer informed patient of New Patient paperwork needing to be completed 5 days prior to the appointment. Writer confirmed paperwork has been sent via [employer].    Appointment was made on: 10/24/24

## 2024-11-14 DIAGNOSIS — F41.9 ANXIETY: ICD-10-CM

## 2024-11-14 RX ORDER — HYDROXYZINE HYDROCHLORIDE 25 MG/1
25 TABLET, FILM COATED ORAL EVERY 6 HOURS PRN
Qty: 90 TABLET | Refills: 1 | Status: SHIPPED | OUTPATIENT
Start: 2024-11-14

## 2025-01-15 ENCOUNTER — OFFICE VISIT (OUTPATIENT)
Dept: BEHAVIORAL/MENTAL HEALTH CLINIC | Facility: CLINIC | Age: 35
End: 2025-01-15
Payer: COMMERCIAL

## 2025-01-15 DIAGNOSIS — F41.9 ANXIETY: Primary | ICD-10-CM

## 2025-01-15 PROCEDURE — 90791 PSYCH DIAGNOSTIC EVALUATION: CPT | Performed by: SOCIAL WORKER

## 2025-01-15 NOTE — PSYCH
Behavioral Health Psychotherapy Assessment    Date of Initial Psychotherapy Assessment: 01/15/25  Referral Source: PCP referred   Has a release of information been signed for the referral source? NA    Preferred Name: Imani Patel  Preferred Pronouns: She/her  YOB: 1990 Age: 34 y.o.  Sex assigned at birth: female   Gender Identity: female  Race:   Preferred Language: English    Emergency Contact:  Full Name: Leann Patel  Relationship to Client: Mother  Contact information: [phone]    Primary Care Physician:  Donnie Deleon MD  [address] [facility] [address] [place] [id number] [phone]  Has a release of information been signed? NA    Physical Health History:  Past surgical procedures:   Past Surgical History:   Procedure Laterality Date    NO PAST SURGERIES        Do you have a history of any of the following:   Past Medical History:   Diagnosis Date    ADHD (attention deficit hyperactivity disorder)     Alcohol use disorder, severe, dependence (HCC) 09/13/2022    Alcohol withdrawal syndrome with complication (HCC) 10/06/2022    Anxiety     BRBPR (bright red blood per rectum) 12/07/2021    Cervical strain 11/03/2021    Chickenpox     Depression     Eczema     Heart palpitations 01/30/2018    Herpes genitalis     Macrocytic anemia 10/06/2022    Other specified behavioral and emotional disorders with onset usually occurring in childhood and adolescence 04/19/2017     Do you have any mobility issues? No    Relevant Family History:  Younger brother did have substance abuse disorder (heroin, on methadone for a while, now sober).    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 She was prescribed Adderall at the time and took this for about one year but unfortunately found that this increased her anxiety. She was prescribed Celexa 1-2 years ago for her anxiety and weaned herself off; she notes not  "noticing any difference to her anxiety symptoms without this medication. Currently, she is prescribed Atarax by her PCP and takes it around bedtime 3-4x/week. Imani notes that she initially reached out to this office regarding concerns with alcohol consumption. Since then, she has coordinated with her PCP who referred her to an addiction specialist- inpatient detox was then recommended. Imani was hospitalized for two days at [facility] (Oct 2023). She notes that unfortunately at the time, she was interviewing for a job with [employer] and tested positive for benzodiazepines and barbiturates - [employer] reportedly requested that she report this to the board and have periodic follow-up testing. She ultimated decided instead to pursue her current role with [employer]. She feels her alcohol consumption has reduced/improved and primarily wants to work on anxiety symptoms, however, acknowledges that they are likely closely tied together. Anxiety symptoms as reported below as well as feelings of guilt, shoulder-specific muscle tension, sweating. Panic attacks denied. Imani reports that her worries are \"not real... have no base.\" She notes increased anxiety the week prior to her menstrual period. She notes she rarely has trouble falling asleep and gets 8+ hours of sleep, though she does not feel rested the following day and her sleep is describes as \"restless\" if she has not had alcohol.    Mental Health Advance Directive:  Do you currently have a Mental Health Advance Directive?no    Diagnosis:   Diagnosis ICD-10-CM Associated Orders   1. Anxiety  F41.9           Initial Assessment:     Current Mental Status:    Appearance: appropriate and casual      Behavior/Manner: cooperative      Affect/Mood:  Anxious and hopeful    Speech:  Normal    Sleep:  Normal    Oriented to: oriented to self, oriented to place and oriented to time       Clinical Symptoms    Anxiety: yes      Depression Symptoms: irritable      Anxiety " Symptoms: excessive worry, fatigues easily, muscle tension, irritable, tremulousness, nervous/anxious and difficulty controlling worry      Have you ever been assaultive to others or the environment: No      Have you ever been self-injurious: Yes    Additional Abuse/Self Harm history:  NSSI during teen years, not since then and no thoughts of self-harming currently.     Counseling History:  Previous Counseling or Treatment  (Mental Health or Drug & Alcohol): No    Have you previously taken psychiatric medications: Yes      Suicide Risk Assessment  Have you ever had a suicide attempt: No    Have you had incidents of suicidal ideation: No    Are you currently experiencing suicidal thoughts: No      Substance Abuse/Addiction Assessment:  Alcohol: Yes    Age of regular use:  5x per week (2-6 drinks per night)  Frequency:  Daily  Heroin: No    Fentanyl: No    Opiates: No    Cocaine: No    Amphetamines: No    Hallucinogens: No    Club Drugs: No    Benzodiazepines: No    Other Rx Meds: No    Marijuana: No    Tobacco/Nicotine: No      Compulsive Behaviors:  Compulsive Behavior Information:  None reported    Disordered Eating History:  Do you have a history of disordered eating: No      Social Determinants of Health:    SDOH:  Stress    Trauma and Abuse History:    Have you ever been abused: No      Legal History:    Have you ever been arrested or had a DUI: Yes      Have you been incarcerated: No      Are you currently on parole/probation: No      Any current Children and Youth involvement: No      Any pending legal charges: No      Additional Legal History:  Underage drinking    Relationship History:    Current marital status: single      Relationship History:  Currently living with two children, Mable (14yo), and Lucina (6yo). Recently engaged to boyfriend over the holidays- he has two chidlren (18yo and 11yo sons). Plans to move in together eventually. Never previously . Fiance and mom/dad are primary supports.  Parents live locally. Imani grew up in [place] and stayed local. Two sisters and a brother all live within an hour. Typically see siblings for holidays.     Employment History    Are you currently employed: Yes      Longest period of employment:  [employer] - love this. Worked in hospital setting for 10 years, with [employer] now for 2 years.    Employer/ Job title:  [employer]    Future work goals:  Thinking about getting NP    Sources of income/financial support:  Work     History:      Status: no history of  duty  Educational History:     Have you ever been diagnosed with a learning disability: No      Highest level of education:  Bachelor's Degree    School attended/attending:  Bachelor's Degree- Psychology, Nursing    Have you ever had an IEP or 504-plan: No      Do you need assistance with reading or writing: No      Recommended Treatment:     Psychotherapy:  Individual sessions    Frequency:  2 times    Session frequency:  Monthly      Visit start and stop times:    01/15/25  Start Time: 1006  Stop Time: 1050  Total Visit Time: 44 minutes

## 2025-01-15 NOTE — BH TREATMENT PLAN
"Outpatient Behavioral Health Psychotherapy Treatment Plan    Imani Patel  1990     Date of Initial Psychotherapy Assessment: 1/15/25   Date of Current Treatment Plan: 01/15/25  Treatment Plan Target Date: 7/14/25  Treatment Plan Expiration Date: 7/14/25    Diagnosis:   1. Anxiety          Area(s) of Need: Anxiety    Long Term Goal 1 (in the client's own words): \"Changing my way of thinking about things\"    Stage of Change: Preparation    Target Date for completion: TBD     Anticipated therapeutic modalities: Individual therapy, client-centered supportive psychotherapy     People identified to complete this goal: Imani ElmerJenny perrin [facility]     Objective 1: (identify the means of measuring success in meeting the objective): Identify past experiences of anxiety and the impact on functioning, describe excessiveness/uncontrollability of the worry and the type, frequency, intensity and duration of anxiety at scheduled sessions over the next six months.       Objective 2: (identify the means of measuring success in meeting the objective): Psychoeducation surrounding cognitive distortions, examining evidence, use Socratic method. Identify, challenge, replace biased fearful self-talk with positive, realistic and empowering self-talk.       I am currently under the care of a [facility] psychiatric provider: no    My [facility] psychiatric provider is: N/A    I am currently taking psychiatric medications: Yes, as prescribed (Atarax 25 mg tablet, 3-4x/week)    I feel that I will be ready for discharge from mental health care when I reach the following (measurable goal/objective): Unsure at this time. Plan to re-evaluate as sessions progress and consider reduction in session frequency when clinically appropriate. Prioritize Goal 1 as outlined above.     [facility] - [facility] would recommend bi-weekly sessions, however, sessions are scheduled less often than monthly at this " time. Will reach out with cancellations or schedule changes as they arise.     For children and adults who have a legal guardian:   Has there been any change to custody orders and/or guardianship status? NA. If yes, attach updated documentation.    I have created my Crisis Plan and have been offered a copy of this plan    Behavioral Health Treatment Plan [facility]: Diagnosis and Treatment Plan explained to Imani Patel acknowledges an understanding of their diagnosis. Imani Patel agrees to this treatment plan.    I have been offered a copy of this Treatment Plan. yes

## 2025-01-15 NOTE — BH CRISIS PLAN
Client Name: Imani Patel       Client YOB: 1990    [facility] Safety Plan      Creation Date: 1/15/25    Created By: Jenny Osman LCSW       Step 1: Warning Signs:   Warning Signs   heart racing   muscle tension   worries            Step 2: Internal Coping Strategies:   Internal Coping Strategies   being alone   cooking   reading            Step 3: People and social settings that provide distraction:   Name Contact Information   Daughters In cell phone   Fiance, his children In cell phone    Places   Home           Step 4: People whom I can ask for help during a crisis:      Name Contact Information    Mom, Leann - good listener [phone]      Step 5: Professionals or agencies I can contact during a crisis:      Clinican/Agency Name Phone Emergency Contact    [facility] [phone] Jenny Osman LCSW      Local Emergency Department Emergency Department Phone Emergency Department Address    [facility] [phone] [address] [place] [address]        Crisis Phone Numbers:   Suicide Prevention Lifeline: Call or Text  333 Crisis Text Line: Text HOME to 661-719   Please note: Some [facility] do not have a separate number for Child/Adolescent specific crisis. If your county is not listed under Child/Adolescent, please call the adult number for your county      Adult Crisis Numbers: Child/Adolescent Crisis Numbers   [facility]: [phone] [facility]: [phone]   [facility]: [phone] [facility]: [phone]   [facility]: [phone] [place]: [phone]   [facility]: [phone] [place]: [phone]   [place]: [phone]   [facility]: [phone]   [facility]: [phone]   [facility]: [phone] (daytime) [phone] (after hours, weekends, holidays)      Step 6: Making the environment safer (plan for lethal means safety):    Patient did not identify any lethal methods: Yes     Optional: What is most important to me and worth living for?      [place] Safety Plan. Heydi Mcnair and Samuel Denis. Used with permission of the authors.

## 2025-03-31 ENCOUNTER — OFFICE VISIT (OUTPATIENT)
Dept: URGENT CARE | Facility: MEDICAL CENTER | Age: 35
End: 2025-03-31
Payer: COMMERCIAL

## 2025-03-31 VITALS
RESPIRATION RATE: 18 BRPM | HEART RATE: 69 BPM | OXYGEN SATURATION: 99 % | DIASTOLIC BLOOD PRESSURE: 84 MMHG | TEMPERATURE: 99.1 F | SYSTOLIC BLOOD PRESSURE: 139 MMHG

## 2025-03-31 DIAGNOSIS — R59.1 LYMPHADENOPATHY: Primary | ICD-10-CM

## 2025-03-31 PROCEDURE — 99213 OFFICE O/P EST LOW 20 MIN: CPT

## 2025-03-31 RX ORDER — PREDNISONE 20 MG/1
40 TABLET ORAL DAILY
Qty: 10 TABLET | Refills: 0 | Status: SHIPPED | OUTPATIENT
Start: 2025-03-31 | End: 2025-04-05

## 2025-03-31 NOTE — PROGRESS NOTES
[facility]        NAME: Michelle Patel is a 35 y.o. female  : 1990    MRN: [id number]  DATE: 2025  TIME: 12:57 PM    Assessment and Plan   Lymphadenopathy [R59.1]  1. Lymphadenopathy  predniSONE 20 mg tablet    amoxicillin-clavulanate (AUGMENTIN) 875-125 mg per tablet            Patient Instructions       Follow up with PCP in 3-5 days.  Proceed to  ER if symptoms worsen.    If tests have been performed at [facility], our office will contact you with results if changes need to be made to the care plan discussed with you at the visit.  You can review your full results on [facility].    Chief Complaint     Chief Complaint   Patient presents with    Swollen Glands     Patient c/o swollen gland on the right side of her neck x 6 days. In the last two day she started with fatigue and body aches x 2 days          History of Present Illness       HPI    Review of Systems   Review of Systems      Current Medications       Current Outpatient Medications:     amoxicillin-clavulanate (AUGMENTIN) 875-125 mg per tablet, Take 1 tablet by mouth every 12 (twelve) hours for 7 days, Disp: 14 tablet, Rfl: 0    predniSONE 20 mg tablet, Take 2 tablets (40 mg total) by mouth daily for 5 days, Disp: 10 tablet, Rfl: 0    hydrOXYzine HCL (ATARAX) 25 mg tablet, Take 1 tablet (25 mg total) by mouth every 6 (six) hours as needed for anxiety, Disp: 90 tablet, Rfl: 1    mometasone (ELOCON) 0.1 % cream, Apply topically daily, Disp: 45 g, Rfl: 1    Current Allergies     Allergies as of 2025    (No Known Allergies)            The following portions of the patient's history were reviewed and updated as appropriate: allergies, current medications, past family history, past medical history, past social history, past surgical history and problem list.     Past Medical History:   Diagnosis Date    ADHD (attention deficit hyperactivity disorder)     Alcohol use disorder, severe, dependence (HCC) 2022    Alcohol  withdrawal syndrome with complication (HCC) 10/06/2022    Anxiety     BRBPR (bright red blood per rectum) 12/07/2021    Cervical strain 11/03/2021    Chickenpox     Depression     Eczema     Heart palpitations 01/30/2018    Herpes genitalis     Macrocytic anemia 10/06/2022    Other specified behavioral and emotional disorders with onset usually occurring in childhood and adolescence 04/19/2017       Past Surgical History:   Procedure Laterality Date    NO PAST SURGERIES         Family History   Problem Relation Age of Onset    COPD Mother     Hypertension Mother     Hyperlipidemia Father     Hypertension Father     No Known Problems Sister     No Known Problems Sister     Addiction problem Brother     Eczema Daughter     Eczema Daughter     Lung cancer Maternal Grandmother     Heart disease Paternal Grandmother     Heart attack Paternal Grandmother     Breast cancer Neg Hx     Ovarian cancer Neg Hx     Colon cancer Neg Hx          Medications have been verified.        Objective   /84   Pulse 69   Temp 99.1 °F (37.3 °C)   Resp 18   SpO2 99%   No LMP recorded.       Physical Exam     Physical Exam  Vitals and nursing note reviewed.   Constitutional:       General: She is not in acute distress.     Appearance: She is not toxic-appearing.   HENT:      Head: Normocephalic and atraumatic.      Comments: R sided Tonsilar lymphadenopathy      Right Ear: Tympanic membrane, ear canal and external ear normal.      Left Ear: Tympanic membrane, ear canal and external ear normal.      Nose: Nose normal.      Mouth/Throat:      Mouth: Mucous membranes are moist.      Pharynx: No oropharyngeal exudate or posterior oropharyngeal erythema.   Eyes:      Conjunctiva/sclera: Conjunctivae normal.   Pulmonary:      Effort: Pulmonary effort is normal.   Musculoskeletal:      Cervical back: Normal range of motion. No rigidity.   Neurological:      Mental Status: She is alert.   Psychiatric:         Mood and Affect: Mood normal.          Behavior: Behavior normal.

## 2025-05-28 ENCOUNTER — TELEPHONE (OUTPATIENT)
Age: 35
End: 2025-05-28

## 2025-05-28 NOTE — TELEPHONE ENCOUNTER
Appointment could not be canceled for Michelle Patel ([id number])  Visit type: YEARLY EXAM [facility]  5/29/2025 8:30 AM (30 minutes) [facility]     Reason for cancellation: Canceled via MyChart     The above appointment could not be canceled due to technical reasons.Please cancel the appointment and inform the patient.